# Patient Record
Sex: MALE | Race: WHITE | Employment: OTHER | URBAN - METROPOLITAN AREA
[De-identification: names, ages, dates, MRNs, and addresses within clinical notes are randomized per-mention and may not be internally consistent; named-entity substitution may affect disease eponyms.]

---

## 2019-07-02 ENCOUNTER — TELEPHONE (OUTPATIENT)
Dept: ADMINISTRATIVE | Age: 69
End: 2019-07-02

## 2019-08-07 ENCOUNTER — HOSPITAL ENCOUNTER (OUTPATIENT)
Dept: ULTRASOUND IMAGING | Age: 69
Discharge: HOME | End: 2019-08-09
Payer: OTHER GOVERNMENT

## 2019-08-07 DIAGNOSIS — R60.0 LOCALIZED EDEMA: ICD-10-CM

## 2019-08-07 PROCEDURE — 93970 EXTREMITY STUDY: CPT

## 2019-08-12 ENCOUNTER — TELEPHONE (OUTPATIENT)
Dept: PRIMARY CARE CLINIC | Age: 69
End: 2019-08-12

## 2019-08-12 NOTE — TELEPHONE ENCOUNTER
Patient's home health nurse Ally Castillo called, she says patient has IV pickline with vancomycin for his infected hip. He is c/o left calf pain, his calf does not look irritated at all, but the ankle does look red. She asked for a call back. Per DR Marcus Arguello called [place] back and advised that he go to the ER. Ally Castillo says because there was also a problem with the pickline, he was instructed to go to the ER already today. Per Ally Castillo, patient's wife is taking him to the ER today when she gets off work, to evaluate pickline and leg.

## 2019-10-21 RX ORDER — METOPROLOL TARTRATE 50 MG/1
50 TABLET, FILM COATED ORAL DAILY
COMMUNITY

## 2019-10-21 RX ORDER — AMLODIPINE BESYLATE 10 MG/1
10 TABLET ORAL DAILY
COMMUNITY

## 2019-10-21 RX ORDER — DULOXETIN HYDROCHLORIDE 30 MG/1
30 CAPSULE, DELAYED RELEASE ORAL DAILY
COMMUNITY
End: 2021-08-26

## 2019-10-21 RX ORDER — CHOLECALCIFEROL (VITAMIN D3) 125 MCG
TABLET ORAL DAILY
COMMUNITY
End: 2022-08-24 | Stop reason: SDUPTHER

## 2019-10-21 RX ORDER — EAR PLUGS
EACH OTIC (EAR) 2 TIMES DAILY
COMMUNITY

## 2019-10-21 RX ORDER — TRAZODONE HYDROCHLORIDE 50 MG/1
150 TABLET ORAL NIGHTLY
COMMUNITY
End: 2020-07-23

## 2019-10-21 RX ORDER — TAMSULOSIN HYDROCHLORIDE 0.4 MG/1
0.4 CAPSULE ORAL 2 TIMES DAILY
COMMUNITY
End: 2021-02-11

## 2019-10-21 RX ORDER — LOSARTAN POTASSIUM 50 MG/1
50 TABLET ORAL DAILY
COMMUNITY

## 2019-10-21 RX ORDER — CHLORAL HYDRATE 500 MG
2000 CAPSULE ORAL DAILY
COMMUNITY

## 2019-10-21 SDOH — HEALTH STABILITY: MENTAL HEALTH: HOW OFTEN DO YOU HAVE A DRINK CONTAINING ALCOHOL?: NEVER

## 2019-10-25 ENCOUNTER — HOSPITAL ENCOUNTER (OUTPATIENT)
Dept: ULTRASOUND IMAGING | Age: 69
Discharge: HOME | End: 2019-10-27
Payer: OTHER GOVERNMENT

## 2019-10-25 DIAGNOSIS — I82.90 DEEP VEIN THROMBOSIS (DVT) OF NON-EXTREMITY VEIN, UNSPECIFIED CHRONICITY: ICD-10-CM

## 2019-10-25 PROCEDURE — 93971 EXTREMITY STUDY: CPT

## 2020-01-13 LAB
ALBUMIN SERPL-MCNC: NORMAL G/DL
ALP BLD-CCNC: NORMAL U/L
ALT SERPL-CCNC: NORMAL U/L
ANION GAP SERPL CALCULATED.3IONS-SCNC: NORMAL MMOL/L
AST SERPL-CCNC: NORMAL U/L
BILIRUB SERPL-MCNC: NORMAL MG/DL
BUN BLDV-MCNC: 12 MG/DL
CALCIUM SERPL-MCNC: NORMAL MG/DL
CHLORIDE BLD-SCNC: NORMAL MMOL/L
CO2: NORMAL
CREAT SERPL-MCNC: 0.9 MG/DL
GFR CALCULATED: NORMAL
GLUCOSE BLD-MCNC: NORMAL MG/DL
POTASSIUM SERPL-SCNC: 3.8 MMOL/L
SODIUM BLD-SCNC: NORMAL MMOL/L
TOTAL PROTEIN: NORMAL

## 2020-01-23 ENCOUNTER — TELEPHONE (OUTPATIENT)
Dept: FAMILY MEDICINE CLINIC | Age: 70
End: 2020-01-23

## 2020-01-23 ENCOUNTER — OFFICE VISIT (OUTPATIENT)
Dept: PRIMARY CARE CLINIC | Age: 70
End: 2020-01-23
Payer: OTHER GOVERNMENT

## 2020-01-23 VITALS
SYSTOLIC BLOOD PRESSURE: 112 MMHG | DIASTOLIC BLOOD PRESSURE: 70 MMHG | HEART RATE: 68 BPM | HEIGHT: 73 IN | WEIGHT: 242 LBS | BODY MASS INDEX: 32.07 KG/M2

## 2020-01-23 PROBLEM — E78.5 HYPERLIPIDEMIA: Status: ACTIVE | Noted: 2020-01-23

## 2020-01-23 PROBLEM — I10 ESSENTIAL HYPERTENSION: Status: ACTIVE | Noted: 2017-11-21

## 2020-01-23 PROBLEM — M19.90 OSTEOARTHRITIS: Status: ACTIVE | Noted: 2020-01-23

## 2020-01-23 PROBLEM — G62.9 NEUROPATHY: Status: ACTIVE | Noted: 2020-01-23

## 2020-01-23 PROBLEM — I48.91 ATRIAL FIBRILLATION (HCC): Status: ACTIVE | Noted: 2020-01-23

## 2020-01-23 PROCEDURE — 4004F PT TOBACCO SCREEN RCVD TLK: CPT | Performed by: INTERNAL MEDICINE

## 2020-01-23 PROCEDURE — 99213 OFFICE O/P EST LOW 20 MIN: CPT | Performed by: INTERNAL MEDICINE

## 2020-01-23 PROCEDURE — G8484 FLU IMMUNIZE NO ADMIN: HCPCS | Performed by: INTERNAL MEDICINE

## 2020-01-23 PROCEDURE — 4040F PNEUMOC VAC/ADMIN/RCVD: CPT | Performed by: INTERNAL MEDICINE

## 2020-01-23 PROCEDURE — 1123F ACP DISCUSS/DSCN MKR DOCD: CPT | Performed by: INTERNAL MEDICINE

## 2020-01-23 PROCEDURE — 3017F COLORECTAL CA SCREEN DOC REV: CPT | Performed by: INTERNAL MEDICINE

## 2020-01-23 PROCEDURE — G8417 CALC BMI ABV UP PARAM F/U: HCPCS | Performed by: INTERNAL MEDICINE

## 2020-01-23 PROCEDURE — G8427 DOCREV CUR MEDS BY ELIG CLIN: HCPCS | Performed by: INTERNAL MEDICINE

## 2020-01-23 ASSESSMENT — ENCOUNTER SYMPTOMS
SORE THROAT: 0
TROUBLE SWALLOWING: 0
FACIAL SWELLING: 0
SHORTNESS OF BREATH: 0
COUGH: 0
PHOTOPHOBIA: 0
DIARRHEA: 0
BLOOD IN STOOL: 0
WHEEZING: 0
EYE ITCHING: 0
CONSTIPATION: 0
ANAL BLEEDING: 0
EYE PAIN: 0
VOMITING: 0
STRIDOR: 0
COLOR CHANGE: 0
NAUSEA: 0
ABDOMINAL PAIN: 0
EYE DISCHARGE: 0
RHINORRHEA: 0

## 2020-01-23 ASSESSMENT — PATIENT HEALTH QUESTIONNAIRE - PHQ9
2. FEELING DOWN, DEPRESSED OR HOPELESS: 0
SUM OF ALL RESPONSES TO PHQ QUESTIONS 1-9: 0
SUM OF ALL RESPONSES TO PHQ9 QUESTIONS 1 & 2: 0
SUM OF ALL RESPONSES TO PHQ QUESTIONS 1-9: 0
1. LITTLE INTEREST OR PLEASURE IN DOING THINGS: 0

## 2020-01-23 NOTE — PROGRESS NOTES
Rate and Rhythm: Normal rate. Rhythm irregular. Heart sounds: Normal heart sounds. No murmur. No friction rub. No gallop. Pulmonary:      Effort: Pulmonary effort is normal. No respiratory distress. Breath sounds: Normal breath sounds. No wheezing or rales. Chest:      Chest wall: No tenderness. Abdominal:      General: Bowel sounds are normal. There is no distension. Palpations: Abdomen is soft. There is no mass. Tenderness: There is no tenderness. There is no guarding or rebound. Musculoskeletal: Normal range of motion. General: No tenderness or deformity. Lymphadenopathy:      Cervical: No cervical adenopathy. Skin:     General: Skin is warm and dry. Coloration: Skin is not pale. Findings: No erythema or rash. Neurological:      General: No focal deficit present. Mental Status: He is alert and oriented to person, place, and time. Cranial Nerves: No cranial nerve deficit. Sensory: No sensory deficit. Gait: Gait normal.      Deep Tendon Reflexes: Reflexes normal.   Psychiatric:         Mood and Affect: Mood normal.         Behavior: Behavior normal.         Thought Content: Thought content normal.         Judgment: Judgment normal.          Last labs reviewed. ASSESSMENT & PLAN :   Problem List        Circulatory    Atrial fibrillation (HCC)     Continue Xarelto. He has  no bleeding. Follow-up with Dr. Adam Perez         Relevant Medications    metoprolol tartrate (LOPRESSOR) 50 MG tablet    losartan (COZAAR) 50 MG tablet    amLODIPine (NORVASC) 10 MG tablet    rivaroxaban (XARELTO) 20 MG TABS tablet    Essential hypertension     Blood pressures are stable. Continue medications and monitor blood pressures at home. Call office if systolics are over 690 over diastolics over 90.          Relevant Medications    metoprolol tartrate (LOPRESSOR) 50 MG tablet    losartan (COZAAR) 50 MG tablet    amLODIPine (NORVASC) 10 MG tablet    rivaroxaban

## 2020-07-23 ENCOUNTER — TELEPHONE (OUTPATIENT)
Dept: PRIMARY CARE CLINIC | Age: 70
End: 2020-07-23

## 2020-07-23 ENCOUNTER — OFFICE VISIT (OUTPATIENT)
Dept: PRIMARY CARE CLINIC | Age: 70
End: 2020-07-23
Payer: MEDICARE

## 2020-07-23 VITALS
SYSTOLIC BLOOD PRESSURE: 118 MMHG | HEIGHT: 73 IN | WEIGHT: 232 LBS | BODY MASS INDEX: 30.75 KG/M2 | DIASTOLIC BLOOD PRESSURE: 62 MMHG | HEART RATE: 72 BPM | TEMPERATURE: 98.3 F | OXYGEN SATURATION: 97 %

## 2020-07-23 PROCEDURE — 99213 OFFICE O/P EST LOW 20 MIN: CPT | Performed by: INTERNAL MEDICINE

## 2020-07-23 PROCEDURE — G8427 DOCREV CUR MEDS BY ELIG CLIN: HCPCS | Performed by: INTERNAL MEDICINE

## 2020-07-23 PROCEDURE — 4004F PT TOBACCO SCREEN RCVD TLK: CPT | Performed by: INTERNAL MEDICINE

## 2020-07-23 PROCEDURE — G8417 CALC BMI ABV UP PARAM F/U: HCPCS | Performed by: INTERNAL MEDICINE

## 2020-07-23 PROCEDURE — 1123F ACP DISCUSS/DSCN MKR DOCD: CPT | Performed by: INTERNAL MEDICINE

## 2020-07-23 PROCEDURE — 4040F PNEUMOC VAC/ADMIN/RCVD: CPT | Performed by: INTERNAL MEDICINE

## 2020-07-23 PROCEDURE — 3017F COLORECTAL CA SCREEN DOC REV: CPT | Performed by: INTERNAL MEDICINE

## 2020-07-23 RX ORDER — TRAZODONE HYDROCHLORIDE 100 MG/1
200 TABLET ORAL NIGHTLY
COMMUNITY

## 2020-07-23 ASSESSMENT — ENCOUNTER SYMPTOMS
ABDOMINAL PAIN: 0
EYE DISCHARGE: 0
CONSTIPATION: 0
STRIDOR: 0
EYE ITCHING: 0
EYE PAIN: 0
BLOOD IN STOOL: 0
FACIAL SWELLING: 0
WHEEZING: 0
ANAL BLEEDING: 0
TROUBLE SWALLOWING: 0
SHORTNESS OF BREATH: 0
VOMITING: 0
COUGH: 0
SORE THROAT: 0
COLOR CHANGE: 0
DIARRHEA: 0
PHOTOPHOBIA: 0
NAUSEA: 0
RHINORRHEA: 0

## 2020-07-23 NOTE — PROGRESS NOTES
2020    Name: Kale Vu : 1950 Sex: male  Age: 79 y.o. 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 He had his left hip replaced. He was initially at [facility] and then transferred to the [place] [facility]. They removed the infected prosthetic joint and put a spacer in it and after the infection was cleared up with IV antibiotics they replaced his joint. He still has to use a cane to ambulate. He has a history of chronic atrial fibrillation on Xarelto. Sees Dr. Pamella Schaumann. He has a history of hypertension, BPH, neuropathy, PTSD and hyperlipidemia. He refuses all immunizations. Colonoscopy was done by [facility] on 2018 which showed a tubular adenoma. Recheck colonoscopy recommended in 3 years. All blood work done at the [place], last one was yesterday. .  We will get results. Review of Systems   Constitutional: Negative for appetite change, fatigue and unexpected weight change. HENT: Negative for congestion, ear pain, facial swelling, rhinorrhea, sore throat, tinnitus and trouble swallowing. Eyes: Negative for photophobia, pain, discharge, itching and visual disturbance. Respiratory: Negative for cough, shortness of breath, wheezing and stridor. Cardiovascular: Negative for chest pain, palpitations and leg swelling. Gastrointestinal: Negative for abdominal pain, anal bleeding, blood in stool, constipation, diarrhea, nausea and vomiting. Endocrine: Negative for cold intolerance, heat intolerance, polydipsia, polyphagia and polyuria. Genitourinary: Negative for difficulty urinating, dysuria, flank pain, frequency, hematuria and urgency. Musculoskeletal: Negative for arthralgias, gait problem, joint swelling and myalgias.         Left hip pain   Skin: Negative for color change, pallor and rash. Allergic/Immunologic: Negative for environmental allergies and food allergies. Neurological: Positive for numbness. Negative for dizziness, tremors, seizures, syncope, speech difficulty, weakness, light-headedness and headaches. Mainly of his  fingers   Hematological: Negative for adenopathy. Does not bruise/bleed easily. Psychiatric/Behavioral: Negative for agitation, behavioral problems, confusion, sleep disturbance and suicidal ideas. The patient is not nervous/anxious.            Current Outpatient Medications:     traZODone (DESYREL) 100 MG tablet, Take 200 mg by mouth nightly, Disp: , Rfl:     metoprolol tartrate (LOPRESSOR) 50 MG tablet, Take 50 mg by mouth daily , Disp: , Rfl:     losartan (COZAAR) 50 MG tablet, Take 50 mg by mouth daily, Disp: , Rfl:     amLODIPine (NORVASC) 10 MG tablet, Take 10 mg by mouth daily, Disp: , Rfl:     Omega-3 Fatty Acids (FISH OIL) 1000 MG CAPS, Take 2,000 mg by mouth daily, Disp: , Rfl:     Multiple Vitamins-Minerals (MULTI FOR HIM) PACK, Take by mouth 2 times daily, Disp: , Rfl:     tamsulosin (FLOMAX) 0.4 MG capsule, Take 0.4 mg by mouth 2 times daily, Disp: , Rfl:     rivaroxaban (XARELTO) 20 MG TABS tablet, Take 20 mg by mouth daily, Disp: , Rfl:     Ergocalciferol (VITAMIN D2) 2000 units TABS, Take by mouth daily, Disp: , Rfl:     DULoxetine (CYMBALTA) 30 MG extended release capsule, Take 30 mg by mouth daily, Disp: , Rfl:      Allergies   Allergen Reactions    Buchu-Cornsilk- Grass-Hydran Other (See Comments)     delirium    Tricyclic Antidepressants Other (See Comments)     delirium    Furosemide     Morphine     Statins     Sulfa Antibiotics         Past Medical History:   Diagnosis Date    Atrial fibrillation (HCC)     Hyperlipidemia     Neuropathy     Osteoarthritis        Health Maintenance Due   Topic Date Due    AAA screen  1950    Hepatitis C screen  1950    DTaP/Tdap/Td vaccine (1 - Tdap) 01/19/1969    Lipid screen  01/19/1990    Diabetes screen  01/19/1990    Shingles Vaccine (1 of 2) 01/19/2000    Low dose CT lung screening  01/19/2005    Pneumococcal 65+ years Vaccine (1 of 1 - PPSV23) 01/19/2015        Patient Active Problem List   Diagnosis    Atrial fibrillation ([facility] [address]    Essential hypertension    Neuropathy    Osteoarthritis    Hyperlipidemia        Past Surgical History:   Procedure Laterality Date    FOOT NEUROMA SURGERY Right     KNEE ARTHROPLASTY Bilateral     SHOULDER SURGERY Right     REMOVAL OF SPUR    TOTAL HIP ARTHROPLASTY      TUMOR EXCISION Right     WARTHINS TUMOR RIGHT NECK        Family History   Problem Relation Age of Onset    Colon Cancer Sister         Social History     Tobacco Use    Smoking status: Heavy Tobacco Smoker     Packs/day: 1.00     Years: 30.00     Pack years: 30.00     Types: Cigarettes     Start date: 1/23/1990    Smokeless tobacco: Never Used   Substance Use Topics    Alcohol use: Never     Frequency: Never    Drug use: Never        Objective  Vitals:    07/23/20 1405   BP: 118/62   Site: Right Upper Arm   Position: Sitting   Cuff Size: Medium Adult   Pulse: 72   Temp: 98.3 °F (36.8 °C)   TempSrc: Temporal   SpO2: 97%   Weight: 232 lb (105.2 kg)   Height: 6' 1\" (1.854 m)        Exam:  Physical Exam  Vitals signs reviewed. Constitutional:       General: He is not in acute distress. Appearance: He is well-developed. He is obese. Comments: Uses cane   HENT:      Head: Normocephalic. Right Ear: External ear normal.      Left Ear: External ear normal.      Nose: Nose normal.      Mouth/Throat:      Pharynx: No oropharyngeal exudate. Eyes:      General: No scleral icterus. Right eye: No discharge. Left eye: No discharge. Conjunctiva/sclera: Conjunctivae normal.      Pupils: Pupils are equal, round, and reactive to light. Neck:      Musculoskeletal: Normal range of motion and neck supple.       Thyroid: No thyromegaly. Cardiovascular:      Rate and Rhythm: Normal rate. Rhythm irregular. Heart sounds: Normal heart sounds. No murmur. No friction rub. No gallop. Pulmonary:      Effort: Pulmonary effort is normal. No respiratory distress. Breath sounds: Normal breath sounds. No wheezing or rales. Chest:      Chest wall: No tenderness. Abdominal:      General: Bowel sounds are normal. There is no distension. Palpations: Abdomen is soft. There is no mass. Tenderness: There is no abdominal tenderness. There is no guarding or rebound. Musculoskeletal: Normal range of motion. General: No tenderness or deformity. Lymphadenopathy:      Cervical: No cervical adenopathy. Skin:     General: Skin is warm and dry. Coloration: Skin is not pale. Findings: No erythema or rash. Neurological:      General: No focal deficit present. Mental Status: He is alert and oriented to person, place, and time. Cranial Nerves: No cranial nerve deficit. Sensory: No sensory deficit. Gait: Gait normal.      Deep Tendon Reflexes: Reflexes normal.   Psychiatric:         Mood and Affect: Mood normal.         Behavior: Behavior normal.         Thought Content: Thought content normal.         Judgment: Judgment normal.          Last labs reviewed. ASSESSMENT & PLAN :   Problem List        Circulatory    Atrial fibrillation ([facility] [address] - Primary     Continue Xarelto and follow-up with cardiologist.  Get results of blood work done yesterday at the [place] [address] hypertension     Blood pressures are stable. Continue medications and monitor blood pressures at home. Call office if systolics are over 416 over diastolics over 90. Nervous and Auditory    Neuropathy     Continue duloxetine            Musculoskeletal and Integument    Osteoarthritis     He has finished physical therapy.   He uses a cane to ambulate            Other    Hyperlipidemia     Watch saturated fats in diet and will monitor lipids         Relevant Medications    metoprolol tartrate (LOPRESSOR) 50 MG tablet    losartan (COZAAR) 50 MG tablet    amLODIPine (NORVASC) 10 MG tablet    rivaroxaban (XARELTO) 20 MG TABS tablet           Return in about 6 months (around 1/23/2021).        Richie Saenz DO  7/24/2020

## 2020-07-24 NOTE — ASSESSMENT & PLAN NOTE
Blood pressures are stable. Continue medications and monitor blood pressures at home. Call office if systolics are over 645 over diastolics over 90.

## 2020-07-24 NOTE — ASSESSMENT & PLAN NOTE
Continue Xarelto and follow-up with cardiologist.  Get results of blood work done yesterday at the [place] in [place]

## 2021-02-11 ENCOUNTER — OFFICE VISIT (OUTPATIENT)
Dept: PRIMARY CARE CLINIC | Age: 71
End: 2021-02-11
Payer: MEDICARE

## 2021-02-11 VITALS
SYSTOLIC BLOOD PRESSURE: 130 MMHG | OXYGEN SATURATION: 98 % | HEART RATE: 77 BPM | BODY MASS INDEX: 33.53 KG/M2 | HEIGHT: 73 IN | DIASTOLIC BLOOD PRESSURE: 80 MMHG | TEMPERATURE: 98.2 F | WEIGHT: 253 LBS

## 2021-02-11 DIAGNOSIS — I48.11 LONGSTANDING PERSISTENT ATRIAL FIBRILLATION (HCC): ICD-10-CM

## 2021-02-11 DIAGNOSIS — G47.33 OSA (OBSTRUCTIVE SLEEP APNEA): ICD-10-CM

## 2021-02-11 DIAGNOSIS — I10 ESSENTIAL HYPERTENSION: Primary | ICD-10-CM

## 2021-02-11 DIAGNOSIS — E78.2 MIXED HYPERLIPIDEMIA: ICD-10-CM

## 2021-02-11 DIAGNOSIS — Z13.220 SCREENING FOR CHOLESTEROL LEVEL: ICD-10-CM

## 2021-02-11 DIAGNOSIS — R05.9 COUGH: ICD-10-CM

## 2021-02-11 DIAGNOSIS — M19.91 PRIMARY OSTEOARTHRITIS, UNSPECIFIED SITE: ICD-10-CM

## 2021-02-11 DIAGNOSIS — G62.9 NEUROPATHY: ICD-10-CM

## 2021-02-11 DIAGNOSIS — F17.200 NICOTINE DEPENDENCE WITH CURRENT USE: ICD-10-CM

## 2021-02-11 DIAGNOSIS — G47.34 HYPOXIA, SLEEP RELATED: ICD-10-CM

## 2021-02-11 DIAGNOSIS — K59.1 FUNCTIONAL DIARRHEA: ICD-10-CM

## 2021-02-11 LAB
CHOLESTEROL, TOTAL: 197 MG/DL (ref 0–199)
HDLC SERPL-MCNC: 31 MG/DL
LDL CHOLESTEROL CALCULATED: ABNORMAL MG/DL (ref 0–99)
T4 FREE: 1.14 NG/DL (ref 0.93–1.7)
TRIGL SERPL-MCNC: 416 MG/DL (ref 0–149)
TSH SERPL DL<=0.05 MIU/L-ACNC: 2.71 UIU/ML (ref 0.27–4.2)
VLDLC SERPL CALC-MCNC: ABNORMAL MG/DL

## 2021-02-11 PROCEDURE — 4040F PNEUMOC VAC/ADMIN/RCVD: CPT | Performed by: INTERNAL MEDICINE

## 2021-02-11 PROCEDURE — 3017F COLORECTAL CA SCREEN DOC REV: CPT | Performed by: INTERNAL MEDICINE

## 2021-02-11 PROCEDURE — 4004F PT TOBACCO SCREEN RCVD TLK: CPT | Performed by: INTERNAL MEDICINE

## 2021-02-11 PROCEDURE — 1123F ACP DISCUSS/DSCN MKR DOCD: CPT | Performed by: INTERNAL MEDICINE

## 2021-02-11 PROCEDURE — G8427 DOCREV CUR MEDS BY ELIG CLIN: HCPCS | Performed by: INTERNAL MEDICINE

## 2021-02-11 PROCEDURE — G8484 FLU IMMUNIZE NO ADMIN: HCPCS | Performed by: INTERNAL MEDICINE

## 2021-02-11 PROCEDURE — 99214 OFFICE O/P EST MOD 30 MIN: CPT | Performed by: INTERNAL MEDICINE

## 2021-02-11 PROCEDURE — G8417 CALC BMI ABV UP PARAM F/U: HCPCS | Performed by: INTERNAL MEDICINE

## 2021-02-11 RX ORDER — DICYCLOMINE HYDROCHLORIDE 10 MG/1
10 CAPSULE ORAL 3 TIMES DAILY PRN
Qty: 60 CAPSULE | Refills: 2 | Status: SHIPPED
Start: 2021-02-11 | End: 2022-08-24 | Stop reason: ALTCHOICE

## 2021-02-11 RX ORDER — AMOXICILLIN 500 MG/1
CAPSULE ORAL
COMMUNITY
Start: 2021-01-07 | End: 2022-08-24 | Stop reason: ALTCHOICE

## 2021-02-11 RX ORDER — TAMSULOSIN HYDROCHLORIDE 0.4 MG/1
0.4 CAPSULE ORAL DAILY
COMMUNITY
End: 2021-02-11

## 2021-02-11 ASSESSMENT — ENCOUNTER SYMPTOMS
CONSTIPATION: 0
TROUBLE SWALLOWING: 0
PHOTOPHOBIA: 0
EYE PAIN: 0
BLOOD IN STOOL: 0
ANAL BLEEDING: 0
NAUSEA: 0
SORE THROAT: 0
DIARRHEA: 1
EYE ITCHING: 0
STRIDOR: 0
COLOR CHANGE: 0
APNEA: 1
RHINORRHEA: 0
EYE DISCHARGE: 0
ABDOMINAL PAIN: 0
VOMITING: 0
WHEEZING: 0
FACIAL SWELLING: 0
COUGH: 1
SHORTNESS OF BREATH: 0

## 2021-02-11 ASSESSMENT — PATIENT HEALTH QUESTIONNAIRE - PHQ9
SUM OF ALL RESPONSES TO PHQ QUESTIONS 1-9: 0
SUM OF ALL RESPONSES TO PHQ QUESTIONS 1-9: 0

## 2021-02-11 NOTE — PROGRESS NOTES
2021    Name: Gibran Bynum : 1950 Sex: male  Age: 70 y.o. Subjective:  Chief Complaint   Patient presents with    6 Month Follow-Up        HPI       He has a history of chronic atrial fibrillation on Xarelto. Sees Dr. Love Adam. He has a history of hypertension, BPH, neuropathy, PTSD and hyperlipidemia. He refuses all immunizations. Colonoscopy was done by [facility] on 2021. This did not show any polyps. Previous colonoscopy in 2018 showed tubular adenomas. He did have internal hemorrhoids and moderate diverticulosis. Recommend recheck colonoscopy in 5 years. He also recommended yearly checks for occult blood starting in 2 years. Blood work to be done at the [place] this spring. Previous blood work in the spring 2020 showed hemoglobin of 11.2 and hematocrit 35.8. He says he has occasional bleeding from his rectum when his hemorrhoids \"act up\". He saw Dr. Cristal Erickson this year for his eye examination    His wife says that she believes he has sleep apnea. She watches him when he sleeps that he will stop breathing for about 10 to 15 seconds and then start gasping for breath and his legs would move around. He also snores quite a bit at night. Patient says that he is very tired during the daytime he sleeps for 2 hours during the day . His wife has checked his O2 level with a pulse oximeter while he stops breathing which she says it goes down to 88%. Once he starts breathing it goes up to about 92%. I feel he needs evaluation for obstructive sleep apnea and restless leg syndrome. We willsend the appropriate order for this. .    He saw Dr. Dean Farias who did some type of procedure him which helped his BPH. He is no longer on Flomax    His wife notes that he has a chronic cough which is worse when he sleeps. He does not cough much during the day. He has not had any evaluation for this. Years ago his wife said the [place] doctor told him he had a \"touch of emphysema\".   Despite this patient continues to smoke a pack a day and has for many many years. We will check pulmonary function tests and a chest x-ray. Note that he is on an ARB. I am pretty sure he was on the arm long before his cough started but to be on the safe side we will discontinue the ARB for about 2 to 3 weeks to see if his cough resolves    Another problem is recurrent diarrhea. He did have some spasm on his colonoscopy. His wife says she has been trying to eliminate certain foods from his diet but nothing really works. He occasionally gets cramps with this. Stools sometimes soft and sometimes watery. There is no blood in his stool. Review of Systems   Constitutional: Negative for appetite change, fatigue and unexpected weight change. HENT: Negative for congestion, ear pain, facial swelling, rhinorrhea, sore throat, tinnitus and trouble swallowing. Eyes: Negative for photophobia, pain, discharge, itching and visual disturbance. Respiratory: Positive for apnea and cough. Negative for shortness of breath, wheezing and stridor. 10-15 seconds apneic spells, snoring, leg movement   Cardiovascular: Negative for chest pain, palpitations and leg swelling. Gastrointestinal: Positive for diarrhea. Negative for abdominal pain, anal bleeding, blood in stool, constipation, nausea and vomiting. Endocrine: Negative for cold intolerance, heat intolerance, polydipsia, polyphagia and polyuria. Genitourinary: Negative for difficulty urinating, dysuria, flank pain, frequency, hematuria and urgency. Musculoskeletal: Negative for arthralgias, gait problem, joint swelling and myalgias. Left hip pain   Skin: Negative for color change, pallor and rash. Allergic/Immunologic: Negative for environmental allergies and food allergies. Neurological: Positive for numbness. Negative for dizziness, tremors, seizures, syncope, speech difficulty, weakness, light-headedness and headaches.         Mainly of his Vaccine (1 of 2) 01/19/2000    Low dose CT lung screening  01/19/2005    Pneumococcal 65+ years Vaccine (1 of 1 - PPSV23) 01/19/2015    Flu vaccine (1) 09/01/2020    Potassium monitoring  01/13/2021    Creatinine monitoring  01/13/2021        Patient Active Problem List   Diagnosis    Atrial fibrillation (HCC)    Essential hypertension    Neuropathy    Osteoarthritis    Hyperlipidemia    Hypoxia, sleep related    LIZ (obstructive sleep apnea)    Functional diarrhea    Cough        Past Surgical History:   Procedure Laterality Date    FOOT NEUROMA SURGERY Right     KNEE ARTHROPLASTY Bilateral     SHOULDER SURGERY Right     REMOVAL OF SPUR    TOTAL HIP ARTHROPLASTY      TUMOR EXCISION Right     WARTHINS TUMOR RIGHT NECK        Family History   Problem Relation Age of Onset    Colon Cancer Sister         Social History     Tobacco Use    Smoking status: Heavy Tobacco Smoker     Packs/day: 1.00     Years: 30.00     Pack years: 30.00     Types: Cigarettes     Start date: 1/23/1990    Smokeless tobacco: Never Used   Substance Use Topics    Alcohol use: Never     Frequency: Never    Drug use: Never        Objective  Vitals:    02/11/21 0757   BP: 130/80   Site: Right Upper Arm   Position: Sitting   Cuff Size: Medium Adult   Pulse: 77   Temp: 98.2 °F (36.8 °C)   TempSrc: Temporal   SpO2: 98%   Weight: 253 lb (114.8 kg)   Height: 6' 1\" (1.854 m)        Exam:  Physical Exam  Vitals signs reviewed. Constitutional:       General: He is not in acute distress. Appearance: He is well-developed. He is obese. Comments: Uses cane   HENT:      Head: Normocephalic. Right Ear: External ear normal.      Left Ear: External ear normal.      Nose: Nose normal.      Mouth/Throat:      Pharynx: No oropharyngeal exudate. Eyes:      General: No scleral icterus. Right eye: No discharge. Left eye: No discharge.       Conjunctiva/sclera: Conjunctivae normal.      Pupils: Pupils are equal, round, and reactive to light. Neck:      Musculoskeletal: Normal range of motion and neck supple. Thyroid: No thyromegaly. Cardiovascular:      Rate and Rhythm: Normal rate. Rhythm irregular. Heart sounds: Normal heart sounds. No murmur. No friction rub. No gallop. Pulmonary:      Effort: Pulmonary effort is normal. No respiratory distress. Breath sounds: No wheezing or rales. Comments: Decreased breath sounds with occasional wheeze  Chest:      Chest wall: No tenderness. Abdominal:      General: Bowel sounds are normal. There is no distension. Palpations: Abdomen is soft. There is no mass. Tenderness: There is no abdominal tenderness. There is no guarding or rebound. Musculoskeletal: Normal range of motion. General: No tenderness or deformity. Comments: Walks with a cane   Lymphadenopathy:      Cervical: No cervical adenopathy. Skin:     General: Skin is warm and dry. Coloration: Skin is not pale. Findings: No erythema or rash. Neurological:      General: No focal deficit present. Mental Status: He is alert and oriented to person, place, and time. Cranial Nerves: No cranial nerve deficit. Sensory: No sensory deficit. Gait: Gait normal.      Deep Tendon Reflexes: Reflexes normal.   Psychiatric:         Mood and Affect: Mood normal.         Behavior: Behavior normal.         Thought Content: Thought content normal.         Judgment: Judgment normal.          Last labs reviewed. ASSESSMENT & PLAN :   Problem List        Circulatory    Atrial fibrillation (HCC)     Continue Xarelto as per cardiology         Relevant Orders    T4, Free    TSH without Reflex    Essential hypertension - Primary     Blood pressures are stable. Continue medications and monitor blood pressures at home. Monitor his blood pressures when we discontinue his losartan.   Let me know if they go over 220 systolic or over 90 diastolic             Respiratory 2/25/2021), or after tests.        Susanna Barrios,   2/11/2021

## 2021-02-11 NOTE — ASSESSMENT & PLAN NOTE
Discontinue the ARB and see if thisstops his cough. Check a chest x-ray and pulmonary function test as he has a long history of cigarette use and has not had any formal evaluation for COPD in several years.   Depending on the results he may need a long-term inhaler and a as needed inhaler along with evaluation by pulmonologist

## 2021-02-11 NOTE — ASSESSMENT & PLAN NOTE
I think this probably secondary to irritable bowel syndrome. His wife is currently doing food elimination study to see if any particular foods aggravate or start his diarrhea.   In the meantime we can try him on Bentyl on an as-needed basis when he gets cramps and diarrhea

## 2021-02-11 NOTE — ASSESSMENT & PLAN NOTE
Set up home sleep study through [facility]. If this is positive we will set him up for CPAP titration. Because of his nocturnal hypoxia I recommend that they check his overnight oxygen level as well.   He may need oxygen at night either bled into his CPAP or through nasal cannula if he does not have LIZ

## 2021-02-11 NOTE — ASSESSMENT & PLAN NOTE
Blood pressures are stable. Continue medications and monitor blood pressures at home. Monitor his blood pressures when we discontinue his losartan.   Let me know if they go over 108 systolic or over 90 diastolic

## 2021-02-18 ENCOUNTER — VIRTUAL VISIT (OUTPATIENT)
Dept: PRIMARY CARE CLINIC | Age: 71
End: 2021-02-18
Payer: MEDICARE

## 2021-02-18 DIAGNOSIS — E78.2 MIXED HYPERLIPIDEMIA: Primary | ICD-10-CM

## 2021-02-18 PROCEDURE — 99442 PR PHYS/QHP TELEPHONE EVALUATION 11-20 MIN: CPT | Performed by: INTERNAL MEDICINE

## 2021-02-18 ASSESSMENT — ENCOUNTER SYMPTOMS
FACIAL SWELLING: 0
VOMITING: 0
APNEA: 1
EYE ITCHING: 0
CONSTIPATION: 0
WHEEZING: 0
EYE PAIN: 0
COLOR CHANGE: 0
BLOOD IN STOOL: 0
SHORTNESS OF BREATH: 0
ABDOMINAL PAIN: 0
EYE DISCHARGE: 0
PHOTOPHOBIA: 0
TROUBLE SWALLOWING: 0
STRIDOR: 0
DIARRHEA: 1
RHINORRHEA: 0
SORE THROAT: 0
NAUSEA: 0
ANAL BLEEDING: 0
COUGH: 1

## 2021-02-18 NOTE — PROGRESS NOTES
2021    Name: Hyacinth Clayton : 1950 Sex: male  Age: 70 y.o. Subjective:  Chief Complaint   Patient presents with    Results     labs        HPI     Patient called to discuss his blood work. His total cholesterol was not bad. His HDL cholesterol is too low. But his triglycerides are over 400. I discussed this with him. [facility] omega-3 fish oil 1000 mg 2 pills daily. I explained to him that this is not working really well that we probably should switch him to something like Lovaza which would bring his triglycerides down nicely. He is unwilling to try that. He says he will try watching his diet specifically his carbs and sweets and trying to lose some weight. He is unable to exercise because of his arthritis. I will see him in about 3 months and will recheck his lipids and if they are still markedly elevated we will go ahead and again recommend that he go on low vasa. When we check his lipids were to make sure that we order an direct LDL instead of the calculated LDL. He has a history of chronic atrial fibrillation on Xarelto. Sees Dr. Sonya Fleischer. He has a history of hypertension, BPH, neuropathy, PTSD and hyperlipidemia. He refuses all immunizations. Blood work to be done at the [place] this spring. Previous blood work in the spring 2020 showed hemoglobin of 11.2 and hematocrit 35.8. He says he has occasional bleeding from his rectum when his hemorrhoids \"act up\". He saw Dr. Yousif Carrillo this year for his eye examination    His wife says that she believes he has sleep apnea. She watches him when he sleeps that he will stop breathing for about 10 to 15 seconds and then start gasping for breath and his legs would move around. He also snores quite a bit at night. Patient says that he is very tired during the daytime he sleeps for 2 hours during the day . His wife has checked his O2 level with a pulse oximeter while he stops breathing which she says it goes down to 88%.   Once he starts breathing it goes up to about 92%. I feel he needs evaluation for obstructive sleep apnea and restless leg syndrome. We willsend the appropriate order for this. .    Unfortunately none of the local facilities will do these tests because his insurance will only pay for the tests if they are done at the [place] facility. I told him to ask the [place] doctor to order these tests for him. His wife notes that he has a chronic cough which is worse when he sleeps. He does not cough much during the day. He has not had any evaluation for this. Years ago his wife said the [place] doctor told him he had a \"touch of emphysema\". Despite this patient continues to smoke a pack a day and has for many many years. We will check pulmonary function tests and a chest x-ray. Note that he is on an ARB. I am pretty sure he was on the arm long before his cough started but to be on the safe side we will discontinue the ARB for about 2 to 3 weeks to see if his cough resolves        Review of Systems   Constitutional: Negative for appetite change, fatigue and unexpected weight change. HENT: Negative for congestion, ear pain, facial swelling, rhinorrhea, sore throat, tinnitus and trouble swallowing. Eyes: Negative for photophobia, pain, discharge, itching and visual disturbance. Respiratory: Positive for apnea and cough. Negative for shortness of breath, wheezing and stridor. 10-15 seconds apneic spells, snoring, leg movement   Cardiovascular: Negative for chest pain, palpitations and leg swelling. Gastrointestinal: Positive for diarrhea. Negative for abdominal pain, anal bleeding, blood in stool, constipation, nausea and vomiting. Endocrine: Negative for cold intolerance, heat intolerance, polydipsia, polyphagia and polyuria. Genitourinary: Negative for difficulty urinating, dysuria, flank pain, frequency, hematuria and urgency. Musculoskeletal: Negative for arthralgias, gait problem, joint swelling and myalgias. Left hip pain   Skin: Negative for color change, pallor and rash. Allergic/Immunologic: Negative for environmental allergies and food allergies. Neurological: Positive for numbness. Negative for dizziness, tremors, seizures, syncope, speech difficulty, weakness, light-headedness and headaches. Mainly of his  fingers   Hematological: Negative for adenopathy. Does not bruise/bleed easily. Psychiatric/Behavioral: Negative for agitation, behavioral problems, confusion, sleep disturbance and suicidal ideas. The patient is not nervous/anxious.          Problems with memory          Current Outpatient Medications:     amoxicillin (AMOXIL) 500 MG capsule, TAKE FOUR CAPSULES BY MOUTH ONE HOUR BEFORE APPOINTMENT, Disp: , Rfl:     dicyclomine (BENTYL) 10 MG capsule, Take 1 capsule by mouth 3 times daily as needed (diarrhea and cramps), Disp: 60 capsule, Rfl: 2    traZODone (DESYREL) 100 MG tablet, Take 200 mg by mouth nightly, Disp: , Rfl:     metoprolol tartrate (LOPRESSOR) 50 MG tablet, Take 50 mg by mouth daily , Disp: , Rfl:     losartan (COZAAR) 50 MG tablet, Take 50 mg by mouth daily, Disp: , Rfl:     amLODIPine (NORVASC) 10 MG tablet, Take 10 mg by mouth daily, Disp: , Rfl:     Omega-3 Fatty Acids (FISH OIL) 1000 MG CAPS, Take 2,000 mg by mouth daily, Disp: , Rfl:     Multiple Vitamins-Minerals (MULTI FOR HIM) PACK, Take by mouth 2 times daily, Disp: , Rfl:     rivaroxaban (XARELTO) 20 MG TABS tablet, Take 20 mg by mouth daily, Disp: , Rfl:     Ergocalciferol (VITAMIN D2) 2000 units TABS, Take by mouth daily, Disp: , Rfl:     DULoxetine (CYMBALTA) 30 MG extended release capsule, Take 30 mg by mouth daily, Disp: , Rfl:      Allergies   Allergen Reactions    Buchu-Cornsilk-Ch Grass-Hydran Other (See Comments)     delirium    Tricyclic Antidepressants Other (See Comments)     delirium    Furosemide     Morphine     Statins     Sulfa Antibiotics         Past Medical History:   Diagnosis Date    Atrial fibrillation ([facility] [address]     Hyperlipidemia     Neuropathy     Osteoarthritis        Health Maintenance Due   Topic Date Due    AAA screen  1950    Hepatitis C screen  1950    COVID-19 Vaccine (1 of 2) 01/19/1966    DTaP/Tdap/Td vaccine (1 - Tdap) 01/19/1969    Shingles Vaccine (1 of 2) 01/19/2000    Low dose CT lung screening  01/19/2005    Pneumococcal 65+ years Vaccine (1 of 1 - PPSV23) 01/19/2015    Flu vaccine (1) 09/01/2020    Potassium monitoring  01/13/2021    Creatinine monitoring  01/13/2021        Patient Active Problem List   Diagnosis    Atrial fibrillation (HCC)    Essential hypertension    Neuropathy    Osteoarthritis    Hyperlipidemia    Hypoxia, sleep related    LIZ (obstructive sleep apnea)    Functional diarrhea    Cough        Past Surgical History:   Procedure Laterality Date    FOOT NEUROMA SURGERY Right     KNEE ARTHROPLASTY Bilateral     SHOULDER SURGERY Right     REMOVAL OF SPUR    TOTAL HIP ARTHROPLASTY      TUMOR EXCISION Right     WARTHINS TUMOR RIGHT NECK        Family History   Problem Relation Age of Onset    Colon Cancer Sister         Social History     Tobacco Use    Smoking status: Heavy Tobacco Smoker     Packs/day: 1.00     Years: 30.00     Pack years: 30.00     Types: Cigarettes     Start date: 1/23/1990    Smokeless tobacco: Never Used   Substance Use Topics    Alcohol use: Never     Frequency: Never    Drug use: Never        Objective  There were no vitals filed for this visit. Exam:      Last labs reviewed. ASSESSMENT & PLAN :   Problem List        Other    Hyperlipidemia - Primary    Relevant Medications    metoprolol tartrate (LOPRESSOR) 50 MG tablet    losartan (COZAAR) 50 MG tablet    amLODIPine (NORVASC) 10 MG tablet    rivaroxaban (XARELTO) 20 MG TABS tablet           Return in about 3 months (around 5/18/2021).        Layne Whalen DO  2/18/2021     Jeanine Powell is a 70 y.o. male evaluated via

## 2021-03-13 PROBLEM — R05.9 COUGH: Status: RESOLVED | Noted: 2021-02-11 | Resolved: 2021-03-13

## 2021-07-21 ENCOUNTER — TELEPHONE (OUTPATIENT)
Dept: PRIMARY CARE CLINIC | Age: 71
End: 2021-07-21

## 2021-07-21 NOTE — TELEPHONE ENCOUNTER
----- Message from Len Hernández sent at 7/15/2021 10:37 AM EDT -----  Subject: Message to Provider    QUESTIONS  Information for Provider? Service expert could not reschedule patient's   appt on 8/12. Patient wanted to move his appointment to 8/20 in the   morning time around 8:30-9:00.  ---------------------------------------------------------------------------  --------------  CALL BACK INFO  What is the best way for the office to contact you? OK to leave message on   voicemail  Preferred Call Back Phone Number? [phone]  ---------------------------------------------------------------------------  --------------  SCRIPT ANSWERS  Relationship to Patient? Other  Representative Name? Eyad Jim - wife  Is the Representative on the appropriate HIPAA document in Epic?  Yes

## 2021-08-26 ENCOUNTER — OFFICE VISIT (OUTPATIENT)
Dept: PRIMARY CARE CLINIC | Age: 71
End: 2021-08-26
Payer: MEDICARE

## 2021-08-26 ENCOUNTER — TELEPHONE (OUTPATIENT)
Dept: PRIMARY CARE CLINIC | Age: 71
End: 2021-08-26

## 2021-08-26 VITALS
DIASTOLIC BLOOD PRESSURE: 85 MMHG | WEIGHT: 250 LBS | BODY MASS INDEX: 32.98 KG/M2 | SYSTOLIC BLOOD PRESSURE: 138 MMHG | OXYGEN SATURATION: 95 % | HEART RATE: 76 BPM | TEMPERATURE: 97.8 F

## 2021-08-26 DIAGNOSIS — F32.1 CURRENT MODERATE EPISODE OF MAJOR DEPRESSIVE DISORDER WITHOUT PRIOR EPISODE (HCC): ICD-10-CM

## 2021-08-26 DIAGNOSIS — I10 ESSENTIAL HYPERTENSION: ICD-10-CM

## 2021-08-26 DIAGNOSIS — I48.11 LONGSTANDING PERSISTENT ATRIAL FIBRILLATION (HCC): ICD-10-CM

## 2021-08-26 DIAGNOSIS — Z91.199 PERSONAL HISTORY OF NONCOMPLIANCE WITH MEDICAL TREATMENT, PRESENTING HAZARDS TO HEALTH: ICD-10-CM

## 2021-08-26 DIAGNOSIS — K59.1 FUNCTIONAL DIARRHEA: ICD-10-CM

## 2021-08-26 DIAGNOSIS — R41.89 COGNITIVE IMPAIRMENT: ICD-10-CM

## 2021-08-26 DIAGNOSIS — J44.9 CHRONIC OBSTRUCTIVE PULMONARY DISEASE, UNSPECIFIED COPD TYPE (HCC): Primary | ICD-10-CM

## 2021-08-26 DIAGNOSIS — G47.33 OSA (OBSTRUCTIVE SLEEP APNEA): ICD-10-CM

## 2021-08-26 DIAGNOSIS — E78.1 PURE HYPERTRIGLYCERIDEMIA: ICD-10-CM

## 2021-08-26 PROBLEM — F32.9 MAJOR DEPRESSIVE DISORDER WITH SINGLE EPISODE: Status: ACTIVE | Noted: 2021-08-26

## 2021-08-26 PROBLEM — F17.200 SMOKES TOBACCO DAILY: Status: ACTIVE | Noted: 2021-08-26

## 2021-08-26 PROBLEM — Z96.659 KNEE JOINT REPLACED BY OTHER MEANS: Status: ACTIVE | Noted: 2021-08-26

## 2021-08-26 PROCEDURE — 4004F PT TOBACCO SCREEN RCVD TLK: CPT | Performed by: INTERNAL MEDICINE

## 2021-08-26 PROCEDURE — 3023F SPIROM DOC REV: CPT | Performed by: INTERNAL MEDICINE

## 2021-08-26 PROCEDURE — G8926 SPIRO NO PERF OR DOC: HCPCS | Performed by: INTERNAL MEDICINE

## 2021-08-26 PROCEDURE — G8427 DOCREV CUR MEDS BY ELIG CLIN: HCPCS | Performed by: INTERNAL MEDICINE

## 2021-08-26 PROCEDURE — 99213 OFFICE O/P EST LOW 20 MIN: CPT | Performed by: INTERNAL MEDICINE

## 2021-08-26 PROCEDURE — G8417 CALC BMI ABV UP PARAM F/U: HCPCS | Performed by: INTERNAL MEDICINE

## 2021-08-26 PROCEDURE — 4040F PNEUMOC VAC/ADMIN/RCVD: CPT | Performed by: INTERNAL MEDICINE

## 2021-08-26 PROCEDURE — 3017F COLORECTAL CA SCREEN DOC REV: CPT | Performed by: INTERNAL MEDICINE

## 2021-08-26 PROCEDURE — 1123F ACP DISCUSS/DSCN MKR DOCD: CPT | Performed by: INTERNAL MEDICINE

## 2021-08-26 RX ORDER — ROSUVASTATIN CALCIUM 5 MG/1
TABLET, COATED ORAL
COMMUNITY
Start: 2021-05-26

## 2021-08-26 ASSESSMENT — ENCOUNTER SYMPTOMS
DIARRHEA: 1
RHINORRHEA: 0
EYE PAIN: 0
ABDOMINAL PAIN: 0
COLOR CHANGE: 0
STRIDOR: 0
APNEA: 1
SORE THROAT: 0
BLOOD IN STOOL: 0
CONSTIPATION: 0
COUGH: 1
EYE ITCHING: 0
WHEEZING: 0
TROUBLE SWALLOWING: 0
NAUSEA: 0
EYE DISCHARGE: 0
VOMITING: 0
FACIAL SWELLING: 0
SHORTNESS OF BREATH: 0
ANAL BLEEDING: 0
PHOTOPHOBIA: 0

## 2021-08-26 NOTE — PROGRESS NOTES
done.    He saw Dr. Chirag Acuña who did some type of procedure him which helped his BPH. He is no longer on Flomax    His wife notes that he has a chronic cough which is worse when he sleeps. He does not cough much during the day. He has not had any evaluation for this. Years ago his wife said the [facility] doctor told him he had a \"touch of emphysema\". Despite this patient continues to smoke a pack a day and has for many many years. We stopped his ARB for a while but this did not make any difference in his chronic cough. He is back on his losartan with no worsening of his cough. We discussed with him CT scan of his chest as a screen for lung cancer but patient refuses to have this done. Another problem is recurrent diarrhea. He did have some spasm on his colonoscopy. His wife says she has been trying to eliminate certain foods from his diet but nothing really works. He occasionally gets cramps with this. Stools sometimes soft and sometimes watery. There is no blood in his stool. He does not want referral to GI for follow-up on this. He says he can control it pretty much with Bentyl and avoiding foods that he knows causes diarrhea    He is refused further immunizations. Specifically refuses his COVID-19 vaccine. I talked to him about the necessity that he cannot improve his underlying lung disease and his other comorbidities. Patient still will not get this done. Reviewed medications. He did not take his duloxetine and the [facility] did start him on rosuvastatin 5 mg along with omega-3 fatty acids. His fasting triglycerides were over 400. They are going to recheck his lipids in September. I will get a copy of those results. Other medications remain the same. Review of Systems   Constitutional: Negative for appetite change, fatigue and unexpected weight change. HENT: Negative for congestion, ear pain, facial swelling, rhinorrhea, sore throat, tinnitus and trouble swallowing.     Eyes: Negative for photophobia, pain, discharge, itching and visual disturbance. Respiratory: Positive for apnea and cough. Negative for shortness of breath, wheezing and stridor. 10-15 seconds apneic spells, snoring, leg movement   Cardiovascular: Negative for chest pain, palpitations and leg swelling. Gastrointestinal: Positive for diarrhea. Negative for abdominal pain, anal bleeding, blood in stool, constipation, nausea and vomiting. Endocrine: Negative for cold intolerance, heat intolerance, polydipsia, polyphagia and polyuria. Genitourinary: Negative for difficulty urinating, dysuria, flank pain, frequency, hematuria and urgency. Musculoskeletal: Negative for arthralgias, gait problem, joint swelling and myalgias. Left hip pain   Skin: Negative for color change, pallor and rash. Allergic/Immunologic: Negative for environmental allergies and food allergies. Neurological: Positive for numbness. Negative for dizziness, tremors, seizures, syncope, speech difficulty, weakness, light-headedness and headaches. Mainly of his  fingers   Hematological: Negative for adenopathy. Does not bruise/bleed easily. Psychiatric/Behavioral: Positive for confusion. Negative for agitation, behavioral problems, sleep disturbance and suicidal ideas. The patient is not nervous/anxious.          Problems with memory          Current Outpatient Medications:     rosuvastatin (CRESTOR) 5 MG tablet, TAKE ONE-HALF TABLET BY MOUTH EVERY DAY, Disp: , Rfl:     amoxicillin (AMOXIL) 500 MG capsule, TAKE FOUR CAPSULES BY MOUTH ONE HOUR BEFORE APPOINTMENT, Disp: , Rfl:     dicyclomine (BENTYL) 10 MG capsule, Take 1 capsule by mouth 3 times daily as needed (diarrhea and cramps), Disp: 60 capsule, Rfl: 2    traZODone (DESYREL) 100 MG tablet, Take 200 mg by mouth nightly, Disp: , Rfl:     metoprolol tartrate (LOPRESSOR) 50 MG tablet, Take 50 mg by mouth daily , Disp: , Rfl:     losartan (COZAAR) 50 MG tablet, Take 50 mg by mouth daily, Disp: , Rfl:     amLODIPine (NORVASC) 10 MG tablet, Take 10 mg by mouth daily, Disp: , Rfl:     Omega-3 Fatty Acids (FISH OIL) 1000 MG CAPS, Take 2,000 mg by mouth daily, Disp: , Rfl:     Multiple Vitamins-Minerals (MULTI FOR HIM) PACK, Take by mouth 2 times daily, Disp: , Rfl:     rivaroxaban (XARELTO) 20 MG TABS tablet, Take 20 mg by mouth daily, Disp: , Rfl:     Ergocalciferol (VITAMIN D2) 2000 units TABS, Take by mouth daily, Disp: , Rfl:      Allergies   Allergen Reactions    Buchu-Cornsilk-Ch Grass-Hydran Other (See Comments)     delirium    Tricyclic Antidepressants Other (See Comments)     delirium    Furosemide     Gemfibrozil     Hydrochlorothiazide Other (See Comments) and Nausea Only     Other reaction(s): NAUSEA,VOMITING, Dizziness, Weakness present    Morphine     Sertraline      Other reaction(s): Dizziness    Statins     Sulfa Antibiotics     Citalopram Other (See Comments) and Nausea And Vomiting     Other reaction(s): NAUSEA,VOMITING, Dizziness, Weakness present        Past Medical History:   Diagnosis Date    Atrial fibrillation ([facility] [address]     Hyperlipidemia     Neuropathy     Osteoarthritis        Health Maintenance Due   Topic Date Due    Hepatitis C screen  Never done    Potassium monitoring  01/13/2021    Creatinine monitoring  01/13/2021    Annual Wellness Visit (AWV)  Never done        Patient Active Problem List   Diagnosis    Atrial fibrillation ([facility] [address]    Essential hypertension    Neuropathy    Osteoarthritis    Hypoxia, sleep related    LIZ (obstructive sleep apnea)    Functional diarrhea    Pure hypertriglyceridemia    Smokes tobacco daily    Personal history of noncompliance with medical treatment, presenting hazards to health    Normal colonoscopy    Major depressive disorder with single episode    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    Chronic obstructive lung disease (HCC)    Cognitive impairment        Past Surgical History:   Procedure Laterality Date    FOOT NEUROMA SURGERY Right     KNEE ARTHROPLASTY Bilateral     SHOULDER SURGERY Right     REMOVAL OF SPUR    TOTAL HIP ARTHROPLASTY      TUMOR EXCISION Right     WARTHINS TUMOR RIGHT NECK        Family History   Problem Relation Age of Onset    Colon Cancer Sister         Social History     Tobacco Use    Smoking status: Heavy Tobacco Smoker     Packs/day: 1.00     Years: 30.00     Pack years: 30.00     Types: Cigarettes     Start date: 1/23/1990    Smokeless tobacco: Never Used   Substance Use Topics    Alcohol use: Never    Drug use: Never        Objective  Vitals:    08/26/21 0704   BP: 138/85   Pulse: 76   Temp: 97.8 °F (36.6 °C)   TempSrc: Temporal   SpO2: 95%   Weight: 250 lb (113.4 kg)        Exam:  Physical Exam  Vitals reviewed. Constitutional:       General: He is not in acute distress. Appearance: He is well-developed. He is obese. Comments: Uses cane   HENT:      Head: Normocephalic and atraumatic. Right Ear: External ear normal.      Left Ear: External ear normal.   Eyes:      General: No scleral icterus. Right eye: No discharge. Left eye: No discharge. Extraocular Movements: Extraocular movements intact. Conjunctiva/sclera: Conjunctivae normal.   Neck:      Thyroid: No thyromegaly. Cardiovascular:      Rate and Rhythm: Normal rate. Rhythm irregular. Heart sounds: Normal heart sounds. No murmur heard. No friction rub. No gallop. Pulmonary:      Effort: Pulmonary effort is normal. No respiratory distress. Breath sounds: No wheezing or rales. Comments: Decreased breath sounds with occasional wheeze  Chest:      Chest wall: No tenderness. Abdominal:      General: Bowel sounds are normal. There is no distension. Palpations: Abdomen is soft. There is no mass. Tenderness: There is no abdominal tenderness. There is no guarding or rebound. Musculoskeletal:         General: No tenderness or deformity.  Normal range of motion. Cervical back: Normal range of motion and neck supple. Comments: Walks with a cane   Lymphadenopathy:      Cervical: No cervical adenopathy. Skin:     General: Skin is warm and dry. Coloration: Skin is not pale. Findings: No erythema or rash. Neurological:      General: No focal deficit present. Mental Status: He is alert and oriented to person, place, and time. Cranial Nerves: No cranial nerve deficit. Sensory: No sensory deficit. Gait: Gait normal.      Deep Tendon Reflexes: Reflexes normal.   Psychiatric:         Mood and Affect: Mood normal.         Behavior: Behavior normal.         Thought Content: Thought content normal.         Judgment: Judgment normal.          Last labs reviewed. ASSESSMENT & PLAN :   Problem List        Circulatory    Atrial fibrillation ([facility] [address]       continue Xarelto and follow-up with cardiology         Essential hypertension     Blood pressures are stable. Continue medications and monitor blood pressures at home. Call office if systolics are over 434 over diastolics over 90. Respiratory    LIZ (obstructive sleep apnea)       obtain CPAP machine and see if his cognition improves after 2 to 3 weeks of use         Chronic obstructive lung disease ([facility] [address] - Primary       check pulmonary function test with bronchodilator and see if he needs inhalers. Relevant Orders    Full PFT Study With Bronchodilator       Digestive    Functional diarrhea       use Bentyl as needed. Avoid trigger foods         Relevant Medications    dicyclomine (BENTYL) 10 MG capsule       Other    Pure hypertriglyceridemia       continue rosuvastatin and omega-3 fish oil.   Lipids to be checked by the [place] in September 2021         Relevant Medications    metoprolol tartrate (LOPRESSOR) 50 MG tablet    losartan (COZAAR) 50 MG tablet    amLODIPine (NORVASC) 10 MG tablet    rivaroxaban (XARELTO) 20 MG TABS tablet    rosuvastatin (CRESTOR) 5 MG tablet    Personal history of noncompliance with medical treatment, presenting hazards to health       nicotine cessation therapy offered. Patient refuses to stop smoking         Major depressive disorder with single episode       continue trazodone. Patient refuses duloxetine         Relevant Medications    traZODone (DESYREL) 100 MG tablet    Cognitive impairment       if no improvement in cognition after 2 to 3 weeks use of his CPAP wife is to call me and we will make referral to neurologist and get an MRI of his brain to look for possible multi-infarct dementia with his underlying atrial fibrillation. Return in about 3 months (around 11/26/2021), or copd , htn and AWV.        Serena Haque DO  8/26/2021

## 2021-08-26 NOTE — ASSESSMENT & PLAN NOTE
continue rosuvastatin and omega-3 fish oil.   Lipids to be checked by the [facility] in September 2021

## 2021-08-26 NOTE — ASSESSMENT & PLAN NOTE
Blood pressures are stable. Continue medications and monitor blood pressures at home. Call office if systolics are over 379 over diastolics over 90.

## 2021-08-26 NOTE — ASSESSMENT & PLAN NOTE
if no improvement in cognition after 2 to 3 weeks use of his CPAP wife is to call me and we will make referral to neurologist and get an MRI of his brain to look for possible multi-infarct dementia with his underlying atrial fibrillation.

## 2021-09-23 LAB
DLCO %PRED: 17.56 %
DLCO PRED: NORMAL
DLCO/VA %PRED: NORMAL
DLCO/VA PRED: NORMAL
DLCO/VA: NORMAL
DLCO: NORMAL
EXPIRATORY TIME-POST: NORMAL
EXPIRATORY TIME: NORMAL
FEF 25-75% %CHNG: NORMAL
FEF 25-75% %PRED-POST: NORMAL
FEF 25-75% %PRED-PRE: NORMAL
FEF 25-75% PRED: NORMAL
FEF 25-75%-POST: NORMAL
FEF 25-75%-PRE: NORMAL
FEV1 %PRED-POST: 2.4 %
FEV1 %PRED-PRE: 3.66 %
FEV1 PRED: NORMAL
FEV1-POST: NORMAL
FEV1-PRE: NORMAL
FEV1/FVC %PRED-POST: NORMAL
FEV1/FVC %PRED-PRE: NORMAL
FEV1/FVC PRED: NORMAL
FEV1/FVC-POST: 64 %
FEV1/FVC-PRE: 65 %
FVC %PRED-POST: NORMAL
FVC %PRED-PRE: NORMAL
FVC PRED: NORMAL
FVC-POST: NORMAL
FVC-PRE: NORMAL
GAW %PRED: NORMAL
GAW PRED: NORMAL
GAW: NORMAL
IC %PRED: NORMAL
IC PRED: NORMAL
IC: NORMAL
MEP: NORMAL
MIP: NORMAL
MVV %PRED-PRE: NORMAL
MVV PRED: NORMAL
MVV-PRE: NORMAL
PEF %PRED-POST: NORMAL
PEF %PRED-PRE: NORMAL
PEF PRED: NORMAL
PEF%CHNG: NORMAL
PEF-POST: NORMAL
PEF-PRE: NORMAL
RAW %PRED: NORMAL
RAW PRED: NORMAL
RAW: NORMAL
RV %PRED: NORMAL
RV PRED: NORMAL
RV: NORMAL
SVC %PRED: NORMAL
SVC PRED: NORMAL
SVC: NORMAL
TLC %PRED: 6.19 %
TLC PRED: NORMAL
TLC: NORMAL
VA %PRED: NORMAL
VA PRED: NORMAL
VA: NORMAL
VTG %PRED: NORMAL
VTG PRED: NORMAL
VTG: NORMAL

## 2021-09-23 ASSESSMENT — PULMONARY FUNCTION TESTS
FEV1_PERCENT_PREDICTED_POST: 2.4
FEV1/FVC_PRE: 65
FEV1/FVC_POST: 64
FEV1_PERCENT_PREDICTED_PRE: 3.66

## 2021-09-29 ENCOUNTER — TELEPHONE (OUTPATIENT)
Dept: PRIMARY CARE CLINIC | Age: 71
End: 2021-09-29

## 2021-09-29 NOTE — TELEPHONE ENCOUNTER
Pulmonary function tests show mild COPD.   But it has worsened compared to previous pulmonary function test.  If breathing gets worse he may need to be followed by a pulmonary doctor either here at the [place]

## 2021-12-02 ENCOUNTER — OFFICE VISIT (OUTPATIENT)
Dept: PRIMARY CARE CLINIC | Age: 71
End: 2021-12-02
Payer: MEDICARE

## 2021-12-02 ENCOUNTER — TELEPHONE (OUTPATIENT)
Dept: PRIMARY CARE CLINIC | Age: 71
End: 2021-12-02

## 2021-12-02 VITALS
WEIGHT: 256 LBS | HEIGHT: 73 IN | OXYGEN SATURATION: 94 % | DIASTOLIC BLOOD PRESSURE: 66 MMHG | SYSTOLIC BLOOD PRESSURE: 108 MMHG | HEART RATE: 86 BPM | BODY MASS INDEX: 33.93 KG/M2 | TEMPERATURE: 98.1 F

## 2021-12-02 VITALS
HEIGHT: 73 IN | OXYGEN SATURATION: 94 % | BODY MASS INDEX: 33.93 KG/M2 | SYSTOLIC BLOOD PRESSURE: 108 MMHG | TEMPERATURE: 98.1 F | WEIGHT: 256 LBS | DIASTOLIC BLOOD PRESSURE: 66 MMHG | HEART RATE: 86 BPM

## 2021-12-02 DIAGNOSIS — I10 ESSENTIAL HYPERTENSION: ICD-10-CM

## 2021-12-02 DIAGNOSIS — G47.33 OSA (OBSTRUCTIVE SLEEP APNEA): Primary | ICD-10-CM

## 2021-12-02 DIAGNOSIS — J44.9 CHRONIC OBSTRUCTIVE PULMONARY DISEASE, UNSPECIFIED COPD TYPE (HCC): ICD-10-CM

## 2021-12-02 DIAGNOSIS — Z00.00 ROUTINE GENERAL MEDICAL EXAMINATION AT A HEALTH CARE FACILITY: Primary | ICD-10-CM

## 2021-12-02 DIAGNOSIS — E78.1 PURE HYPERTRIGLYCERIDEMIA: ICD-10-CM

## 2021-12-02 DIAGNOSIS — F17.200 SMOKES TOBACCO DAILY: ICD-10-CM

## 2021-12-02 DIAGNOSIS — F32.89 OTHER DEPRESSION: ICD-10-CM

## 2021-12-02 PROBLEM — F32.A DEPRESSION: Status: ACTIVE | Noted: 2021-12-02

## 2021-12-02 PROCEDURE — 99213 OFFICE O/P EST LOW 20 MIN: CPT | Performed by: INTERNAL MEDICINE

## 2021-12-02 PROCEDURE — 3017F COLORECTAL CA SCREEN DOC REV: CPT | Performed by: INTERNAL MEDICINE

## 2021-12-02 PROCEDURE — G8926 SPIRO NO PERF OR DOC: HCPCS | Performed by: INTERNAL MEDICINE

## 2021-12-02 PROCEDURE — 4004F PT TOBACCO SCREEN RCVD TLK: CPT | Performed by: INTERNAL MEDICINE

## 2021-12-02 PROCEDURE — G0439 PPPS, SUBSEQ VISIT: HCPCS | Performed by: INTERNAL MEDICINE

## 2021-12-02 PROCEDURE — G8427 DOCREV CUR MEDS BY ELIG CLIN: HCPCS | Performed by: INTERNAL MEDICINE

## 2021-12-02 PROCEDURE — G8417 CALC BMI ABV UP PARAM F/U: HCPCS | Performed by: INTERNAL MEDICINE

## 2021-12-02 PROCEDURE — G8484 FLU IMMUNIZE NO ADMIN: HCPCS | Performed by: INTERNAL MEDICINE

## 2021-12-02 PROCEDURE — 3023F SPIROM DOC REV: CPT | Performed by: INTERNAL MEDICINE

## 2021-12-02 PROCEDURE — 1123F ACP DISCUSS/DSCN MKR DOCD: CPT | Performed by: INTERNAL MEDICINE

## 2021-12-02 PROCEDURE — 4040F PNEUMOC VAC/ADMIN/RCVD: CPT | Performed by: INTERNAL MEDICINE

## 2021-12-02 RX ORDER — LANOLIN ALCOHOL/MO/W.PET/CERES
3 CREAM (GRAM) TOPICAL DAILY
COMMUNITY

## 2021-12-02 RX ORDER — ASPIRIN 81 MG/1
TABLET ORAL
COMMUNITY
Start: 2021-11-04 | End: 2021-12-02 | Stop reason: ALTCHOICE

## 2021-12-02 SDOH — ECONOMIC STABILITY: FOOD INSECURITY: WITHIN THE PAST 12 MONTHS, THE FOOD YOU BOUGHT JUST DIDN'T LAST AND YOU DIDN'T HAVE MONEY TO GET MORE.: NEVER TRUE

## 2021-12-02 SDOH — ECONOMIC STABILITY: FOOD INSECURITY: WITHIN THE PAST 12 MONTHS, YOU WORRIED THAT YOUR FOOD WOULD RUN OUT BEFORE YOU GOT MONEY TO BUY MORE.: NEVER TRUE

## 2021-12-02 ASSESSMENT — ENCOUNTER SYMPTOMS
SHORTNESS OF BREATH: 0
EYE ITCHING: 0
ANAL BLEEDING: 0
COUGH: 1
DIARRHEA: 1
WHEEZING: 0
COLOR CHANGE: 0
VOMITING: 0
BLOOD IN STOOL: 0
EYE DISCHARGE: 0
NAUSEA: 0
TROUBLE SWALLOWING: 0
STRIDOR: 0
ABDOMINAL PAIN: 0
CONSTIPATION: 0
SORE THROAT: 0
APNEA: 1
PHOTOPHOBIA: 0
RHINORRHEA: 0
FACIAL SWELLING: 0
EYE PAIN: 0

## 2021-12-02 ASSESSMENT — LIFESTYLE VARIABLES: HOW OFTEN DO YOU HAVE A DRINK CONTAINING ALCOHOL: 0

## 2021-12-02 ASSESSMENT — SOCIAL DETERMINANTS OF HEALTH (SDOH): HOW HARD IS IT FOR YOU TO PAY FOR THE VERY BASICS LIKE FOOD, HOUSING, MEDICAL CARE, AND HEATING?: NOT VERY HARD

## 2021-12-02 ASSESSMENT — PATIENT HEALTH QUESTIONNAIRE - PHQ9
SUM OF ALL RESPONSES TO PHQ QUESTIONS 1-9: 1
1. LITTLE INTEREST OR PLEASURE IN DOING THINGS: 0
SUM OF ALL RESPONSES TO PHQ QUESTIONS 1-9: 1
2. FEELING DOWN, DEPRESSED OR HOPELESS: 1
SUM OF ALL RESPONSES TO PHQ QUESTIONS 1-9: 1
SUM OF ALL RESPONSES TO PHQ9 QUESTIONS 1 & 2: 1

## 2021-12-02 NOTE — PATIENT INSTRUCTIONS
Learning About Low-Carbohydrate Diets  What is a low-carbohydrate diet? A low-carbohydrate (or \"low-carb\") diet limits foods and drinks that have carbohydrates. This includes grains, fruits, milk and yogurt, and starchy vegetables like potatoes, beans, and corn. It also avoids foods and drinks that have added sugar. Instead, low-carb diets include foods that are high in protein and fat. Why might you follow a low-carb diet? Low-carb diets may be used for a variety of reasons, such as for weight loss. People who have diabetes may use a low-carb diet to help manage their blood sugar levels. What should you do before you start the diet? Talk to your doctor before you try any diet. This is even more important if you have health problems like kidney disease, heart disease, or diabetes. Your doctor may suggest that you meet with a registered dietitian. A dietitian can help you make an eating plan that works for you. What foods do you eat on a low-carb diet? On a low-carb diet, you choose foods that are high in protein and fat. Examples of these are:  · Meat, poultry, and fish. · Eggs. · Nuts, such as walnuts, pecans, almonds, and peanuts. · Peanut butter and other nut butters. · Tofu. · Avocado. · Jayda Nova. · Non-starchy vegetables like broccoli, cauliflower, green beans, mushrooms, peppers, lettuce, and spinach. · Unsweetened non-dairy milks like almond milk and coconut milk. · Cheese, cottage cheese, and cream cheese. Current as of: December 17, 2020               Content Version: 13.0  © 2006-2021 Healthwise, [employer]. Care instructions adapted under license by [facility] [place] ([facility]). If you have questions about a medical condition or this instruction, always ask your healthcare professional. [address] any warranty or liability for your use of this information. Learning About Benefits From Quitting Smoking  How does quitting smoking make you healthier?      If you're thinking about quitting smoking, you may have a few reasons to be smoke-free. Your health may be one of them. · When you quit smoking, you lower your risks for cancer, lung disease, heart attack, stroke, blood vessel disease, and blindness from macular degeneration. · When you're smoke-free, you get sick less often, and you heal faster. You are less likely to get colds, flu, bronchitis, and pneumonia. · As a nonsmoker, you may find that your mood is better and you are less stressed. When and how will you feel healthier? Quitting has real health benefits that start from day 1 of being smoke-free. And the longer you stay smoke-free, the healthier you get and the better you feel. The first hours  · After just 20 minutes, your blood pressure and heart rate go down. That means there's less stress on your heart and blood vessels. · Within 12 hours, the level of carbon monoxide in your blood drops back to normal. That makes room for more oxygen. With more oxygen in your body, you may notice that you have more energy than when you smoked. After 2 weeks  · Your lungs start to work better. · Your risk of heart attack starts to drop. After 1 month  · When your lungs are clear, you cough less and breathe deeper, so it's easier to be active. · Your sense of taste and smell return. That means you can enjoy food more than you have since you started smoking. Over the years  · Over the years, your risks of heart disease, heart attack, and stroke are lower. · After 10 years, your risk of dying from lung cancer is cut by about half. And your risk for many other types of cancer is lower too. How would quitting help others in your life? When you quit smoking, you improve the health of everyone who now breathes in your smoke. · Their heart, lung, and cancer risks drop, much like yours. · They are sick less.  For babies and small children, living smoke-free means they're less likely to have ear infections, pneumonia, and bronchitis. · If you're a woman who is or will be pregnant someday, quitting smoking means a healthier . · Children who are close to you are less likely to become adult smokers. Where can you learn more? Go to https://chgrey.[employer]. org and sign in to your Integration Management account. Enter [phone] [id number] [phone] in the [facility] box to learn more about \"Learning About Benefits From Quitting Smoking. \"     If you do not have an account, please click on the \"Sign Up Now\" link. Current as of: 2021               Content Version: 13.0  © 3712-5615 Healthwise, [employer]. Care instructions adapted under license by [facility] ([facility]). If you have questions about a medical condition or this instruction, always ask your healthcare professional. [address] any warranty or liability for your use of this information. Personalized Preventive Plan for Toni Keto - 2021  Medicare offers a range of preventive health benefits. Some of the tests and screenings are paid in full while other may be subject to a deductible, co-insurance, and/or copay. Some of these benefits include a comprehensive review of your medical history including lifestyle, illnesses that may run in your family, and various assessments and screenings as appropriate. After reviewing your medical record and screening and assessments performed today your provider may have ordered immunizations, labs, imaging, and/or referrals for you. A list of these orders (if applicable) as well as your Preventive Care list are included within your After Visit Summary for your review. Other Preventive Recommendations:    · A preventive eye exam performed by an eye specialist is recommended every 1-2 years to screen for glaucoma; cataracts, macular degeneration, and other eye disorders. · A preventive dental visit is recommended every 6 months.   · Try to get at least 150 minutes of exercise per week or 10,000 steps per day on a pedometer . · Order or download the FREE \"Exercise & Physical Activity: Your Everyday Guide\" from [employer]. Call 8-100.387.5742 or search [employer] online. · You need 7687-8504 mg of calcium and 2343-6511 IU of vitamin D per day. It is possible to meet your calcium requirement with diet alone, but a vitamin D supplement is usually necessary to meet this goal.  · When exposed to the sun, use a sunscreen that protects against both UVA and UVB radiation with an SPF of 30 or greater. Reapply every 2 to 3 hours or after sweating, drying off with a towel, or swimming. · Always wear a seat belt when traveling in a car. Always wear a helmet when riding a bicycle or motorcycle.

## 2021-12-02 NOTE — ASSESSMENT & PLAN NOTE
recommend very strongly that he quit smoking as this would help his symptoms of COPD. Patient refuses to quit smoking. He refuses the use of a rescue inhaler.  Await results of pulmonary function test done recently

## 2021-12-02 NOTE — ASSESSMENT & PLAN NOTE
Blood pressures are stable. Continue medications and monitor blood pressures at home. Call office if systolics are over 023 over diastolics over 90. continue amlodipine, losartan and metoprolol.  Monitor blood pressures

## 2021-12-02 NOTE — PROGRESS NOTES
Medicare Annual Wellness Visit  Name: Abby Stevens Date: 2021   MRN: <[id number]> Sex: Male   Age: 70 y.o. Ethnicity: Non- / Non    : 1950 Race: White (non-)      Shawna Beck is here for Medicare AWV    Screenings for behavioral, psychosocial and functional/safety risks, and cognitive dysfunction are all negative except as indicated below. These results, as well as other patient data from the [address] form, are documented in Flowsheets linked to this Encounter. Allergies   Allergen Reactions    Buchu-Cornsilk-Ch Grass-Hydran Other (See Comments)     delirium    Tricyclic Antidepressants Other (See Comments)     delirium    Furosemide     Gemfibrozil     Hydrochlorothiazide Other (See Comments) and Nausea Only     Other reaction(s): NAUSEA,VOMITING, Dizziness, Weakness present    Morphine     Sertraline      Other reaction(s): Dizziness    Statins     Sulfa Antibiotics     Citalopram Other (See Comments) and Nausea And Vomiting     Other reaction(s): NAUSEA,VOMITING, Dizziness, Weakness present       Prior to Visit Medications    Medication Sig Taking?  Authorizing Provider   melatonin 3 MG TABS tablet Take 3 mg by mouth daily Yes Historical Provider, MD   rosuvastatin (CRESTOR) 5 MG tablet TAKE ONE-HALF TABLET BY MOUTH EVERY DAY Yes Historical Provider, MD   amoxicillin (AMOXIL) 500 MG capsule TAKE FOUR CAPSULES BY MOUTH ONE HOUR BEFORE APPOINTMENT Yes Historical Provider, MD   dicyclomine (BENTYL) 10 MG capsule Take 1 capsule by mouth 3 times daily as needed (diarrhea and cramps) Yes Windy Cedillo DO   traZODone (DESYREL) 100 MG tablet Take 200 mg by mouth nightly Yes Historical Provider, MD   metoprolol tartrate (LOPRESSOR) 50 MG tablet Take 50 mg by mouth daily  Yes Historical Provider, MD   losartan (COZAAR) 50 MG tablet Take 50 mg by mouth daily Yes Historical Provider, MD   amLODIPine (NORVASC) 10 MG tablet Take 10 mg by mouth daily Yes Historical Provider, MD   Omega-3 Fatty Acids (FISH OIL) 1000 MG CAPS Take 2,000 mg by mouth daily Yes Historical Provider, MD   Multiple Vitamins-Minerals (MULTI FOR HIM) PACK Take by mouth 2 times daily Yes Historical Provider, MD   rivaroxaban (XARELTO) 20 MG TABS tablet Take 20 mg by mouth daily Yes Historical Provider, MD   Ergocalciferol (VITAMIN D2) 2000 units TABS Take by mouth daily Yes Historical Provider, MD       Past Medical History:   Diagnosis Date    Atrial fibrillation ([address]     Hyperlipidemia     Neuropathy     Osteoarthritis        Past Surgical History:   Procedure Laterality Date    FOOT NEUROMA SURGERY Right     KNEE ARTHROPLASTY Bilateral     SHOULDER SURGERY Right     REMOVAL OF SPUR    TOTAL HIP ARTHROPLASTY      TUMOR EXCISION Right     WARTHINS TUMOR RIGHT NECK       Family History   Problem Relation Age of Onset    Colon Cancer Sister        CareTeam (Including outside providers/suppliers regularly involved in providing care):   Patient Care Team:  [address] [place] [address] as PCP - General (Internal Medicine)  [address] [place] [address] as PCP - [facility] Empaneled Provider    Wt Readings from Last 3 Encounters:   12/02/21 256 lb (116.1 kg)   12/02/21 256 lb (116.1 kg)   08/26/21 250 lb (113.4 kg)     Vitals:    12/02/21 0935   BP: 108/66   Pulse: 86   Temp: 98.1 °F (36.7 °C)   SpO2: 94%   Weight: 256 lb (116.1 kg)   Height: 6' 1\" (1.854 m)     Body mass index is 33.78 kg/m². Based upon direct observation of the patient, evaluation of cognition reveals recent and remote memory intact. Patient's complete Health Risk Assessment and screening values have been reviewed and are found in Flowsheets. The following problems were reviewed today and where indicated follow up appointments were made and/or referrals ordered.     Positive Risk Factor Screenings with Interventions:         Substance History:  Social History     Tobacco History     Smoking Status  Heavy Tobacco Smoker Smoking Start Date  1/23/1990 Smoking Frequency  1 pack/day for 30 years (30 pk yrs) Smoking Tobacco Type  Cigarettes    Smokeless Tobacco Use  Never Used          Alcohol History     Alcohol Use Status  Never          Drug Use     Drug Use Status  Never          Sexual Activity     Sexually Active  Yes Partners  Female               Alcohol Screening:       A score of 8 or more is associated with harmful or hazardous drinking. A score of 13 or more in women, and 15 or more in men, is likely to indicate alcohol dependence. Substance Abuse Interventions:  · Alcohol misuse/dependence:  patient does not use alcohol   · Patient refuses to quit smoking    General Health and ACP:  General  In general, how would you say your health is?: Good  In the past 7 days, have you experienced any of the following? New or Increased Pain, New or Increased Fatigue, Loneliness, Social Isolation, Stress or Anger?: (!) Stress  Do you get the social and emotional support that you need?: Yes  Do you have a Living Will?: Yes  Advance Directives     Power of  Living Will ACP-Advance Directive ACP-Power of     Not on File Not on File Not on File Not on File      General Health Risk Interventions:  · No Living Will: ACP documents already completed- patient asked to provide copy to the office   · Patient prays a lot    Health Habits/Nutrition:  Health Habits/Nutrition  Do you exercise for at least 20 minutes 2-3 times per week?: (!) No  Have you lost any weight without trying in the past 3 months?: No  Do you eat only one meal per day?: No  Have you seen the dentist within the past year?: Yes  Body mass index: (!) 33.77  Health Habits/Nutrition Interventions:  · Inadequate physical activity:  unable to exercise because of medical condition and knee pain      ADL:  ADLs  In the past 7 days, did you need help from others to perform any of the following everyday activities?  Eating, dressing, grooming, bathing, toileting, or walking/balance?: (!) Bathing  In the past 7 days, did you need help from others to take care of any of the following? Laundry, housekeeping, banking/finances, shopping, telephone use, food preparation, transportation, or taking medications?: None  ADL Interventions:  · Patient declines any further evaluation/treatment for this issue   · Wife helps with bathing    Personalized Preventive Plan   Current Health Maintenance Status  Immunization History   Administered Date(s) Administered    Anthrax (BioThrax) 03/11/2003    Hepatitis A Adult (Havrix, Vaqta) 01/25/2003    Hepatitis B 01/25/2003    Influenza Virus Vaccine 11/21/2003    MMR 07/14/2002    Meningococcal MPSV4 (Menomune) 01/25/2003    Polio IPV (IPOL) 07/14/2002    Td, unspecified formulation 01/25/2003    Typhoid Vi capsular polysaccharide (Typhim VI) 01/25/2003    Vaccinia - Smallpox ([id number]) 01/25/2003    Yellow Fever (YF-Vax) 01/25/2003        Health Maintenance   Topic Date Due    Hepatitis C screen  Never done    Potassium monitoring  01/13/2021    Creatinine monitoring  01/13/2021    Flu vaccine (1) 09/01/2021    COVID-19 Vaccine (1) 08/16/2022 (Originally 1/19/1962)    Pneumococcal 65+ years Vaccine (1 of 1 - PPSV23) 08/17/2022 (Originally 1/19/2015)    DTaP/Tdap/Td vaccine (1 - Tdap) 08/26/2022 (Originally 1/26/2003)    Shingles Vaccine (1 of 2) 08/26/2022 (Originally 1/19/2000)    AAA screen  08/26/2022 (Originally 1950)    Lipid screen  02/11/2022    Colon cancer screen colonoscopy  01/18/2031    Hepatitis A vaccine  Aged Out    Hepatitis B vaccine  Aged Out    Hib vaccine  Aged Out    Meningococcal (ACWY) vaccine  Aged Out    Low dose CT lung screening  Discontinued     Recommendations for Preventive Services Due: see orders and patient instructions/AVS.  .   Recommended screening schedule for the next 5-10 years is provided to the patient in written form: see Patient Instructions/AVS.    There are no diagnoses linked to this encounter. Cardiovascular Disease Risk Counseling: Assessed the patient's risk to develop cardiovascular disease and reviewed main risk factors. Reviewed steps to reduce disease risk including:   · Quitting tobacco use, reducing amount smoked, or not starting the habit  · Making healthy food choices  · Being physically active and gradualy increasing activity levels   · Reduce weight and determine a healthy BMI goal  · Monitor blood pressure and treat if higher than 140/90 mmHg  · Maintain blood total cholesterol levels under 5 mmol/l or 190 mg/dl  · Maintain LDL cholesterol levels under 3.0 mmol/l or 115 mg/dl   · Control blood glucose levels  · Consider taking aspirin (75 mg daily), once blood pressure is controlled   Provided a follow up plan.   Time spent (minutes): 6

## 2021-12-02 NOTE — TELEPHONE ENCOUNTER
Get last blood work and office visit from [facility] report of PFTs done at [facility] this year

## 2021-12-02 NOTE — PROGRESS NOTES
2021    Name: Diego Marie : 1950 Sex: male  Age: 70 y.o. Subjective:  Chief Complaint   Patient presents with    Hypertension     3 month visit        Hypertension  Pertinent negatives include no chest pain, headaches, palpitations or shortness of breath. He has a history of chronic atrial fibrillation on Xarelto. Sees Dr. Melvina De Guzman. He has a history of hypertension, BPH, neuropathy, PTSD and hyperlipidemia. Colonoscopy was done by [facility] on 2021. This did not show any polyps. Previous colonoscopy in 2018 showed tubular adenomas. He did have internal hemorrhoids and moderate diverticulosis. Recommend recheck colonoscopy in 5 years. He also recommended yearly checks for occult blood starting in 2 years. Blood work was done at the [place] [facility] in [facility]. We will get results. Previous blood work in the spring 2020 showed hemoglobin of 11.2 and hematocrit 35.8. He says he has occasional bleeding from his rectum when his hemorrhoids \"act up\". He saw Dr. Mindy Eli this year for his eye examination    He was sent to the [place] for evaluation for possible sleep apnea. They did a sleep study on him which showed an AHI of about 80 per wife. She also said that 20% of the study showed \"central apnea\". We will have to get the actual study results from the [place]. The got him a CPAP machine which he will be fitted for on 2021. Wife talks about his worsening memory problems, he has decreased concentration and is occasionally confused. His sleep studies showed an AHI of four over 80/h. The gave him a CPAP machine and he has tried a couple of masks but nothing seems to work pretty has nocturia and has urgency and it is a problem getting the apparatus off before he gets to the bathroom. I have asked him to check with him again about different types of mask and if nothing works he may be a candidate for [employer] therapy.      His chest x-ray did not show any nodules he had COPD changes. He had pulmonary function tests and chest x-ray done at [facility] within the last couple of months but I do not have the results. We'll get them. We talked about a rescue inhaler when he gets short of breath and refuses to use one    He saw Korina Chinchilla nurse practitioner Dr. Brady Rojas office. In October 2021. Reviewed his report his PSA had decreased to 6.63. Previous biopsy in April 2021 was negative for malignancy    Another problem is recurrent diarrhea. He did have some spasm on his colonoscopy. His wife says she has been trying to eliminate certain foods from his diet but nothing really works. He occasionally gets cramps with this. Stools sometimes soft and sometimes watery. There is no blood in his stool. He does not want referral to GI for follow-up on this. He says he can control it pretty much with Bentyl and avoiding foods that he knows causes diarrhea    He is refused further immunizations. Specifically refuses his COVID-19 vaccine. I talked to him about the necessity that he cannot improve his underlying lung disease and his other comorbidities. Patient still will not get this done. Patient refuses a flu vaccine    Reviewed medications. He did not take his duloxetine and the [place] did start him on rosuvastatin 5 mg along with omega-3 fatty acids. His fasting triglycerides were over 400. They are going to recheck his ,  I will get a copy of those results. Other medications remain the same. Review of Systems   Constitutional: Negative for appetite change, fatigue and unexpected weight change. HENT: Negative for congestion, ear pain, facial swelling, rhinorrhea, sore throat, tinnitus and trouble swallowing. Eyes: Negative for photophobia, pain, discharge, itching and visual disturbance. Respiratory: Positive for apnea and cough. Negative for shortness of breath, wheezing and stridor.          10-15 seconds apneic spells, snoring, leg movement Cardiovascular: Negative for chest pain, palpitations and leg swelling. Gastrointestinal: Positive for diarrhea. Negative for abdominal pain, anal bleeding, blood in stool, constipation, nausea and vomiting. Endocrine: Negative for cold intolerance, heat intolerance, polydipsia, polyphagia and polyuria. Genitourinary: Negative for difficulty urinating, dysuria, flank pain, frequency, hematuria and urgency. Musculoskeletal: Negative for arthralgias, gait problem, joint swelling and myalgias. Left hip pain   Skin: Negative for color change, pallor and rash. Allergic/Immunologic: Negative for environmental allergies and food allergies. Neurological: Positive for numbness. Negative for dizziness, tremors, seizures, syncope, speech difficulty, weakness, light-headedness and headaches. Mainly of his  fingers   Hematological: Negative for adenopathy. Does not bruise/bleed easily. Psychiatric/Behavioral: Positive for confusion. Negative for agitation, behavioral problems, sleep disturbance and suicidal ideas. The patient is not nervous/anxious.          Problems with memory          Current Outpatient Medications:     melatonin 3 MG TABS tablet, Take 3 mg by mouth daily, Disp: , Rfl:     rosuvastatin (CRESTOR) 5 MG tablet, TAKE ONE-HALF TABLET BY MOUTH EVERY DAY, Disp: , Rfl:     amoxicillin (AMOXIL) 500 MG capsule, TAKE FOUR CAPSULES BY MOUTH ONE HOUR BEFORE APPOINTMENT, Disp: , Rfl:     dicyclomine (BENTYL) 10 MG capsule, Take 1 capsule by mouth 3 times daily as needed (diarrhea and cramps), Disp: 60 capsule, Rfl: 2    traZODone (DESYREL) 100 MG tablet, Take 200 mg by mouth nightly, Disp: , Rfl:     metoprolol tartrate (LOPRESSOR) 50 MG tablet, Take 50 mg by mouth daily , Disp: , Rfl:     losartan (COZAAR) 50 MG tablet, Take 50 mg by mouth daily, Disp: , Rfl:     amLODIPine (NORVASC) 10 MG tablet, Take 10 mg by mouth daily, Disp: , Rfl:     Omega-3 Fatty Acids (FISH OIL) 1000 MG CAPS, Take 2,000 mg by mouth daily, Disp: , Rfl:     Multiple Vitamins-Minerals (MULTI FOR HIM) PACK, Take by mouth 2 times daily, Disp: , Rfl:     rivaroxaban (XARELTO) 20 MG TABS tablet, Take 20 mg by mouth daily, Disp: , Rfl:     Ergocalciferol (VITAMIN D2) 2000 units TABS, Take by mouth daily, Disp: , Rfl:      Allergies   Allergen Reactions    Buchu-Cornsilk-Ch Grass-Hydran Other (See Comments)     delirium    Tricyclic Antidepressants Other (See Comments)     delirium    Furosemide     Gemfibrozil     Hydrochlorothiazide Other (See Comments) and Nausea Only     Other reaction(s): NAUSEA,VOMITING, Dizziness, Weakness present    Morphine     Sertraline      Other reaction(s): Dizziness    Statins     Sulfa Antibiotics     Citalopram Other (See Comments) and Nausea And Vomiting     Other reaction(s): NAUSEA,VOMITING, Dizziness, Weakness present        Past Medical History:   Diagnosis Date    Atrial fibrillation ([facility] [address]     Hyperlipidemia     Neuropathy     Osteoarthritis        Health Maintenance Due   Topic Date Due    Hepatitis C screen  Never done    Potassium monitoring  01/13/2021    Creatinine monitoring  01/13/2021        Patient Active Problem List   Diagnosis    Atrial fibrillation (HCC)    Essential hypertension    Neuropathy    Osteoarthritis    Hypoxia, sleep related    LIZ (obstructive sleep apnea)    Functional diarrhea    Pure hypertriglyceridemia    Smokes tobacco daily    Personal history of noncompliance with medical treatment, presenting hazards to health    Normal colonoscopy    Major depressive disorder with single episode    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    Chronic obstructive lung disease (HCC)    Cognitive impairment    Depression        Past Surgical History:   Procedure Laterality Date    FOOT NEUROMA SURGERY Right     KNEE ARTHROPLASTY Bilateral     SHOULDER SURGERY Right     REMOVAL OF SPUR    TOTAL HIP ARTHROPLASTY      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 Exam conducted with a chaperone present. Constitutional:       General: He is not in acute distress. Appearance: He is well-developed. He is obese. Comments: Uses a walker   HENT:      Head: Normocephalic and atraumatic. Right Ear: External ear normal.      Left Ear: External ear normal.   Eyes:      General: No scleral icterus. Right eye: No discharge. Left eye: No discharge. Extraocular Movements: Extraocular movements intact. Conjunctiva/sclera: Conjunctivae normal.   Neck:      Thyroid: No thyromegaly. Cardiovascular:      Rate and Rhythm: Normal rate. Rhythm irregular. Heart sounds: Normal heart sounds. No murmur heard. No friction rub. No gallop. Pulmonary:      Effort: Pulmonary effort is normal. No respiratory distress. Breath sounds: No wheezing or rales. Comments: Decreased breath sounds with occasional wheeze  Chest:      Chest wall: No tenderness. Abdominal:      General: Bowel sounds are normal. There is no distension. Palpations: Abdomen is soft. There is no mass. Tenderness: There is no abdominal tenderness. There is no guarding or rebound. Musculoskeletal:         General: No tenderness or deformity. Normal range of motion. Cervical back: Normal range of motion and neck supple.       Comments: Walks with a cane   Lymphadenopathy: tablet           Return in about 3 months (around 3/2/2022), or copd,hl.        Dahiana Hui,   12/2/2021

## 2021-12-30 ENCOUNTER — OFFICE VISIT (OUTPATIENT)
Dept: FAMILY MEDICINE CLINIC | Age: 71
End: 2021-12-30
Payer: MEDICARE

## 2021-12-30 VITALS
BODY MASS INDEX: 33.93 KG/M2 | HEIGHT: 73 IN | RESPIRATION RATE: 18 BRPM | WEIGHT: 256 LBS | HEART RATE: 77 BPM | OXYGEN SATURATION: 93 % | SYSTOLIC BLOOD PRESSURE: 110 MMHG | TEMPERATURE: 98.6 F | DIASTOLIC BLOOD PRESSURE: 70 MMHG

## 2021-12-30 DIAGNOSIS — R05.9 COUGH: Primary | ICD-10-CM

## 2021-12-30 LAB
Lab: ABNORMAL
PERFORMING INSTRUMENT: ABNORMAL
QC PASS/FAIL: ABNORMAL
SARS-COV-2, POC: DETECTED

## 2021-12-30 PROCEDURE — G8417 CALC BMI ABV UP PARAM F/U: HCPCS | Performed by: FAMILY MEDICINE

## 2021-12-30 PROCEDURE — 87426 SARSCOV CORONAVIRUS AG IA: CPT | Performed by: FAMILY MEDICINE

## 2021-12-30 PROCEDURE — 3017F COLORECTAL CA SCREEN DOC REV: CPT | Performed by: FAMILY MEDICINE

## 2021-12-30 PROCEDURE — 4040F PNEUMOC VAC/ADMIN/RCVD: CPT | Performed by: FAMILY MEDICINE

## 2021-12-30 PROCEDURE — G8484 FLU IMMUNIZE NO ADMIN: HCPCS | Performed by: FAMILY MEDICINE

## 2021-12-30 PROCEDURE — 4004F PT TOBACCO SCREEN RCVD TLK: CPT | Performed by: FAMILY MEDICINE

## 2021-12-30 PROCEDURE — 1123F ACP DISCUSS/DSCN MKR DOCD: CPT | Performed by: FAMILY MEDICINE

## 2021-12-30 PROCEDURE — 99214 OFFICE O/P EST MOD 30 MIN: CPT | Performed by: FAMILY MEDICINE

## 2021-12-30 PROCEDURE — G8427 DOCREV CUR MEDS BY ELIG CLIN: HCPCS | Performed by: FAMILY MEDICINE

## 2021-12-30 NOTE — PROGRESS NOTES
21  Jordan Palacio : 1950 Sex: male  Age: 70 y.o. Assessment and Plan:  Kalee Mueller was seen today for cough and congestion. Diagnoses and all orders for this visit:    Cough  -     POCT COVID-19, Antigen    Rapid COVID positive  Lack of validated outpatient treatments readily available at this time discussed with patient   Wife wants monoclonal antibody treatment  Reviewed that his may not be effective against Omicron - they understand and still wish to proceed  Paperwork for [facility] completed   Supportive care otherwise explicitly reviewed  Updated CDC recommendations for isolation reviewed with patient, who expressed good verbal understanding  Pt advised to seek medical care immediately for worsening sx       Return if symptoms worsen or fail to improve. Chief Complaint   Patient presents with    Cough    Congestion       HPI  Pt here for acute concerns  Wife is COVID positive  Last night he was coughing a lot and it woke his wife up   O2 was 83% and his fever was 101.4  EMS was called, pt refused transport to ER   He states he feels fine, and will not go to ER  Denies any SOB  Denies feeling poorly at all   Rapid COVID positive   He is not vaccinated   +Underlying COPD  Pt's wife wants him to have infusion  We discussed that it might not be efficacious against the Omecron variant, but not way to know what         Problem list reviewed and updated in full with patient today as necessary. A comprehensive ROS was negative, except as documented above.          Current Outpatient Medications:     melatonin 3 MG TABS tablet, Take 3 mg by mouth daily, Disp: , Rfl:     rosuvastatin (CRESTOR) 5 MG tablet, TAKE ONE-HALF TABLET BY MOUTH EVERY DAY, Disp: , Rfl:     amoxicillin (AMOXIL) 500 MG capsule, TAKE FOUR CAPSULES BY MOUTH ONE HOUR BEFORE APPOINTMENT, Disp: , Rfl:     dicyclomine (BENTYL) 10 MG capsule, Take 1 capsule by mouth 3 times daily as needed (diarrhea and cramps), Disp: 60 capsule, Rfl: 2    traZODone (DESYREL) 100 MG tablet, Take 200 mg by mouth nightly, Disp: , Rfl:     metoprolol tartrate (LOPRESSOR) 50 MG tablet, Take 50 mg by mouth daily , Disp: , Rfl:     losartan (COZAAR) 50 MG tablet, Take 50 mg by mouth daily, Disp: , Rfl:     amLODIPine (NORVASC) 10 MG tablet, Take 10 mg by mouth daily, Disp: , Rfl:     Omega-3 Fatty Acids (FISH OIL) 1000 MG CAPS, Take 2,000 mg by mouth daily, Disp: , Rfl:     Multiple Vitamins-Minerals (MULTI FOR HIM) PACK, Take by mouth 2 times daily, Disp: , Rfl:     rivaroxaban (XARELTO) 20 MG TABS tablet, Take 20 mg by mouth daily, Disp: , Rfl:     Ergocalciferol (VITAMIN D2) 2000 units TABS, Take by mouth daily, Disp: , Rfl:   Allergies   Allergen Reactions    Buchu-Cornsilk-Ch Grass-Hydran Other (See Comments)     delirium    Tricyclic Antidepressants Other (See Comments)     delirium    Furosemide     Gemfibrozil     Hydrochlorothiazide Other (See Comments) and Nausea Only     Other reaction(s): NAUSEA,VOMITING, Dizziness, Weakness present    Morphine     Sertraline      Other reaction(s): Dizziness    Statins     Sulfa Antibiotics     Citalopram Other (See Comments) and Nausea And Vomiting     Other reaction(s): NAUSEA,VOMITING, Dizziness, Weakness present       Pt's past medical and surgical history were reviewed and updated as necessary today   Pt's family and social history were reviewed and updated as necessary today        Vitals:    12/30/21 1324   BP: 110/70   Pulse: 77   Resp: 18   Temp: 98.6 °F (37 °C)   TempSrc: Temporal   SpO2: 93%   Weight: 256 lb (116.1 kg)   Height: 6' 1\" (1.854 m)       Physical Exam  Constitutional:       Appearance: Normal appearance. HENT:      Head: Normocephalic and atraumatic. Eyes:      Conjunctiva/sclera: Conjunctivae normal.   Cardiovascular:      Rate and Rhythm: Normal rate. Rhythm irregular. Heart sounds: Normal heart sounds.    Pulmonary:      Effort: Pulmonary effort is normal.   Abdominal:      Palpations: Abdomen is soft. Tenderness: There is no abdominal tenderness. Musculoskeletal:         General: Normal range of motion. Skin:     General: Skin is warm and dry. Neurological:      General: No focal deficit present. Mental Status: He is alert and oriented to person, place, and time. Psychiatric:         Mood and Affect: Mood normal.         Behavior: Behavior normal.        Counseled patient as appropriate and relevant regarding above diagnosis, including possible risks and complications, especially if left uncontrolled. Counseled patient as appropriate and relevant regarding any  possible side effects, risks, and alternatives to treatment; patient and/or guardian verbalizes understanding, and is in agreement with the plan as detailed above. Reviewed age and gender appropriate health screening exams and vaccinations. Advised patient regarding importance of keeping up with recommended health maintenance and to schedule as soon as possible if overdue, as this is important in assessing for undiagnosed pathology, especially cancer, as well as protecting against potentially harmful/life threatening disease. If discussed, any educational materials and/or home exercises printed for patient's review and were included in patient instructions on his/her After Visit Summary and given to patient at the end of visit. Advised patient to call with any new medication issues, and and other concerns/complaints prior to scheduled follow up. All questions answered to the patient's satisfaction.         Seen By:  Stephanie Anguiano MD

## 2022-01-03 ENCOUNTER — TELEPHONE (OUTPATIENT)
Dept: PRIMARY CARE CLINIC | Age: 72
End: 2022-01-03

## 2022-01-03 NOTE — TELEPHONE ENCOUNTER
----- Message from Kelsey Mccord sent at 1/3/2022 10:43 AM EST -----  Subject: Message to Provider    QUESTIONS  Information for Provider? Patient has tested positive for Covid on   12/29/21. Patient has been seen in ED, and was given Infusion for Covid   antibodies. The patient is experiencing cough and SOB and diarrhea. Patient is not sure what to do. Please call the patient to advise.   ---------------------------------------------------------------------------  --------------  CALL BACK INFO  What is the best way for the office to contact you? OK to leave message on   voicemail  Preferred Call Back Phone Number? [phone]  ---------------------------------------------------------------------------  --------------  SCRIPT ANSWERS  Relationship to Patient? Third Party  Representative Name?  Wife

## 2022-01-10 DIAGNOSIS — J44.9 CHRONIC OBSTRUCTIVE PULMONARY DISEASE, UNSPECIFIED COPD TYPE (HCC): ICD-10-CM

## 2022-02-24 ENCOUNTER — OFFICE VISIT (OUTPATIENT)
Dept: PRIMARY CARE CLINIC | Age: 72
End: 2022-02-24
Payer: MEDICARE

## 2022-02-24 VITALS
BODY MASS INDEX: 33.8 KG/M2 | OXYGEN SATURATION: 94 % | TEMPERATURE: 97.8 F | HEIGHT: 73 IN | DIASTOLIC BLOOD PRESSURE: 76 MMHG | SYSTOLIC BLOOD PRESSURE: 128 MMHG | WEIGHT: 255 LBS | HEART RATE: 56 BPM

## 2022-02-24 DIAGNOSIS — Z91.199 PERSONAL HISTORY OF NONCOMPLIANCE WITH MEDICAL TREATMENT, PRESENTING HAZARDS TO HEALTH: ICD-10-CM

## 2022-02-24 DIAGNOSIS — I10 ESSENTIAL HYPERTENSION: ICD-10-CM

## 2022-02-24 DIAGNOSIS — I48.11 LONGSTANDING PERSISTENT ATRIAL FIBRILLATION (HCC): Primary | ICD-10-CM

## 2022-02-24 DIAGNOSIS — E78.2 MIXED HYPERLIPIDEMIA: ICD-10-CM

## 2022-02-24 DIAGNOSIS — F32.1 CURRENT MODERATE EPISODE OF MAJOR DEPRESSIVE DISORDER WITHOUT PRIOR EPISODE (HCC): ICD-10-CM

## 2022-02-24 DIAGNOSIS — K59.1 FUNCTIONAL DIARRHEA: ICD-10-CM

## 2022-02-24 DIAGNOSIS — J44.9 CHRONIC OBSTRUCTIVE PULMONARY DISEASE, UNSPECIFIED COPD TYPE (HCC): ICD-10-CM

## 2022-02-24 DIAGNOSIS — E78.1 PURE HYPERTRIGLYCERIDEMIA: ICD-10-CM

## 2022-02-24 DIAGNOSIS — G47.33 OSA (OBSTRUCTIVE SLEEP APNEA): ICD-10-CM

## 2022-02-24 PROCEDURE — 1123F ACP DISCUSS/DSCN MKR DOCD: CPT | Performed by: INTERNAL MEDICINE

## 2022-02-24 PROCEDURE — 4040F PNEUMOC VAC/ADMIN/RCVD: CPT | Performed by: INTERNAL MEDICINE

## 2022-02-24 PROCEDURE — 99213 OFFICE O/P EST LOW 20 MIN: CPT | Performed by: INTERNAL MEDICINE

## 2022-02-24 PROCEDURE — 4004F PT TOBACCO SCREEN RCVD TLK: CPT | Performed by: INTERNAL MEDICINE

## 2022-02-24 PROCEDURE — G8427 DOCREV CUR MEDS BY ELIG CLIN: HCPCS | Performed by: INTERNAL MEDICINE

## 2022-02-24 PROCEDURE — G8484 FLU IMMUNIZE NO ADMIN: HCPCS | Performed by: INTERNAL MEDICINE

## 2022-02-24 PROCEDURE — 3023F SPIROM DOC REV: CPT | Performed by: INTERNAL MEDICINE

## 2022-02-24 PROCEDURE — 3017F COLORECTAL CA SCREEN DOC REV: CPT | Performed by: INTERNAL MEDICINE

## 2022-02-24 PROCEDURE — G8417 CALC BMI ABV UP PARAM F/U: HCPCS | Performed by: INTERNAL MEDICINE

## 2022-02-24 ASSESSMENT — ENCOUNTER SYMPTOMS
CONSTIPATION: 0
WHEEZING: 0
FACIAL SWELLING: 0
SORE THROAT: 0
STRIDOR: 0
NAUSEA: 0
COUGH: 1
COLOR CHANGE: 0
EYE DISCHARGE: 0
TROUBLE SWALLOWING: 0
EYE ITCHING: 0
RHINORRHEA: 0
SHORTNESS OF BREATH: 0
APNEA: 0
DIARRHEA: 1
VOMITING: 0
ANAL BLEEDING: 0
EYE PAIN: 0
BLOOD IN STOOL: 0
ABDOMINAL PAIN: 0
PHOTOPHOBIA: 0

## 2022-02-24 NOTE — ASSESSMENT & PLAN NOTE
watch diet, rosuvastatin per [place].  We'll get most recent lipid profile from the [place]

## 2022-02-24 NOTE — ASSESSMENT & PLAN NOTE
patient refuses to quit smoking even though he knows it will worsen his COPD and increases risk of heart attacks, strokes and lung cancer.  I told him that he will continue to cough as long as he continues to smoke

## 2022-02-24 NOTE — ASSESSMENT & PLAN NOTE
continue using his CPAP as he gets good results with this.  He sleeps better and his restless leg syndrome has improved

## 2022-02-24 NOTE — ASSESSMENT & PLAN NOTE
Blood pressures are stable. Continue medications and monitor blood pressures at home. Call office if systolics are over 699 over diastolics over 90.

## 2022-02-24 NOTE — PROGRESS NOTES
2022    Name: Lydia Liriano : 1950 Sex: male  Age: 67 y.o. Subjective:  Chief Complaint   Patient presents with    Hypertension        Hypertension  Pertinent negatives include no chest pain, headaches, palpitations or shortness of breath. He has a history of chronic atrial fibrillation on Xarelto. He needs to see Dr. Wilder Garcia as he hasn't seen him in over a year. We'll make that referral    He has a history of hypertension, BPH, neuropathy, PTSD and hyperlipidemia. Colonoscopy was done by [facility] on 2021. This did not show any polyps. Previous colonoscopy in 2018 showed tubular adenomas. He did have internal hemorrhoids and moderate diverticulosis. Recommend recheck colonoscopy in 5 years. He also recommended yearly checks for occult blood starting in 2 years. Blood work was done at the [facility] clinic in [facility]. We will get results. Previous blood work in the spring 2020 showed hemoglobin of 11.2 and hematocrit 35.8. He says he has occasional bleeding from his rectum when his hemorrhoids \"act up\". He saw Dr. Darlys Gowers this year for his eye examination    He was sent to the [facility] for evaluation for possible sleep apnea. They did a sleep study on him which showed an AHI of about 80 per wife. She also said that 20% of the study showed \"central apnea\". We will have to get the actual study results from the [facility]. The got him a CPAP machine which he will be fitted for on 2021. Wife talks about his worsening memory problems, he has decreased concentration and is occasionally confused. His sleep studies showed an AHI of four over 80/h. The gave him a CPAP machine and he has been using this regularly. He has an auto PAP. His wife says that he is sleeping better and his restless legs have improved. His chest x-ray did not show any nodules and he had changes consistent with COPD .  He had pulmonary function tests which showed mild obstructive lung disease. We'll get them. We talked about a rescue inhaler when he gets short of breath but refuses to use one. Unfortunately he refuses to quit smoking    He saw Liseth Alston nurse practitioner Dr. Stacey Nath office. . Reviewed his report his PSA had decreased to 6.63. Previous biopsy in April 2021 was negative for malignancy    Another problem is recurrent diarrhea. He did have some spasm on his colonoscopy. His wife says she has been trying to eliminate certain foods from his diet but nothing really works. He occasionally gets cramps with this. Stools sometimes soft and sometimes watery. There is no blood in his stool. He does not want referral to GI for follow-up on this. He says he can control it pretty much with Bentyl and avoiding foods that he knows causes diarrhea    He is refused further immunizations. Specifically refuses his COVID-19 vaccine. I talked to him the risk that he takes for severe infection which may lead to hospitalization or possible death in view of his underlying lung disease and his other comorbidities. Patient still will not get this done. Patient refuses a flu vaccine    Reviewed medications. He did not take his duloxetine and the [place] did start him on rosuvastatin 5 mg along with omega-3 fatty acids. His fasting triglycerides were over 400. They are going to recheck his ,  I will get a copy of those results. Other medications remain the same. He sees his psychologist at the [place] on a regular basis but refuses to take any antidepressants. He takes trazodone to help him  Sleep. He will be seeing his orthopod at the [place] for his right pain. They may consider surgery    Review of Systems   Constitutional: Negative for appetite change, fatigue and unexpected weight change. HENT: Negative for congestion, ear pain, facial swelling, rhinorrhea, sore throat, tinnitus and trouble swallowing. Eyes: Negative for photophobia, pain, discharge, itching and visual disturbance. by mouth daily , Disp: , Rfl:     losartan (COZAAR) 50 MG tablet, Take 50 mg by mouth daily, Disp: , Rfl:     amLODIPine (NORVASC) 10 MG tablet, Take 10 mg by mouth daily Taking 2x day, Disp: , Rfl:     Omega-3 Fatty Acids (FISH OIL) 1000 MG CAPS, Take 2,000 mg by mouth daily, Disp: , Rfl:     Multiple Vitamins-Minerals (MULTI FOR HIM) PACK, Take by mouth 2 times daily, Disp: , Rfl:     rivaroxaban (XARELTO) 20 MG TABS tablet, Take 20 mg by mouth daily, Disp: , Rfl:     Ergocalciferol (VITAMIN D2) 2000 units TABS, Take by mouth daily, Disp: , Rfl:      Allergies   Allergen Reactions    Buchu-Cornsilk-Ch Grass-Hydran Other (See Comments)     delirium    Tricyclic Antidepressants Other (See Comments)     delirium    Furosemide     Gemfibrozil     Hydrochlorothiazide Other (See Comments) and Nausea Only     Other reaction(s): NAUSEA,VOMITING, Dizziness, Weakness present    Morphine     Sertraline      Other reaction(s): Dizziness    Statins     Sulfa Antibiotics     Citalopram Other (See Comments) and Nausea And Vomiting     Other reaction(s): NAUSEA,VOMITING, Dizziness, Weakness present        Past Medical History:   Diagnosis Date    [facility]     Hyperlipidemia     Neuropathy     Osteoarthritis        Health Maintenance Due   Topic Date Due    Hepatitis C screen  Never done    Potassium monitoring  01/13/2021    Creatinine monitoring  01/13/2021    Lipid screen  02/11/2022        Patient Active Problem List   Diagnosis    Atrial fibrillation ([facility]    Essential hypertension    Neuropathy    Osteoarthritis    Hypoxia, sleep related    LIZ (obstructive sleep apnea)    Functional diarrhea    Pure hypertriglyceridemia    Smokes tobacco daily    Personal history of noncompliance with medical treatment, presenting hazards to health    Normal colonoscopy    Current moderate episode of major depressive disorder without prior episode ([facility]    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    Chronic obstructive lung disease (HCC)    Cognitive impairment    Depression    Hyperlipidemia        Past Surgical History:   Procedure Laterality Date    FOOT NEUROMA SURGERY Right     KNEE ARTHROPLASTY Bilateral     SHOULDER SURGERY Right     REMOVAL OF SPUR    TOTAL HIP ARTHROPLASTY      TUMOR EXCISION Right     WARTHINS TUMOR RIGHT NECK        Family History   Problem Relation Age of Onset    Colon Cancer Sister         Social History     Tobacco Use    Smoking status: Heavy Tobacco Smoker     Packs/day: 1.00     Years: 30.00     Pack years: 30.00     Types: Cigarettes     Start date: 1/23/1990    Smokeless tobacco: Never Used   Substance Use Topics    Alcohol use: Never    Drug use: Never        Objective  Vitals:    02/24/22 0842   BP: 128/76   Pulse: 56   Temp: 97.8 °F (36.6 °C)   SpO2: 94%   Weight: 255 lb (115.7 kg)   Height: 6' 1\" (1.854 m)        Exam:  Physical Exam  Vitals reviewed. Exam conducted with a chaperone present. Constitutional:       General: He is not in acute distress. Appearance: He is well-developed. He is obese. Comments: Uses a walker   HENT:      Head: Normocephalic and atraumatic. Right Ear: External ear normal.      Left Ear: External ear normal.      Nose:      Comments: Fleshy nodule left side of tongue near tip     Mouth/Throat:      Mouth: Mucous membranes are moist.   Eyes:      General: No scleral icterus. Right eye: No discharge. Left eye: No discharge. Extraocular Movements: Extraocular movements intact. Conjunctiva/sclera: Conjunctivae normal.   Neck:      Thyroid: No thyromegaly. Cardiovascular:      Rate and Rhythm: Normal rate. Rhythm irregular. Heart sounds: Normal heart sounds. No murmur heard. No friction rub. No gallop. Pulmonary:      Effort: Pulmonary effort is normal. No respiratory distress. Breath sounds: No wheezing or rales.       Comments: Decreased breath sounds with occasional wheeze  Chest:      Chest wall: No tenderness. Abdominal:      General: Bowel sounds are normal. There is no distension. Palpations: Abdomen is soft. There is no mass. Tenderness: There is no abdominal tenderness. There is no guarding or rebound. Musculoskeletal:         General: No tenderness or deformity. Normal range of motion. Cervical back: Normal range of motion and neck supple. Comments: Walks with a cane   Lymphadenopathy:      Cervical: No cervical adenopathy. Skin:     General: Skin is warm and dry. Coloration: Skin is not pale. Findings: No erythema or rash. Neurological:      General: No focal deficit present. Mental Status: He is alert and oriented to person, place, and time. Cranial Nerves: No cranial nerve deficit. Sensory: No sensory deficit. Gait: Gait normal.      Deep Tendon Reflexes: Reflexes normal.   Psychiatric:         Mood and Affect: Mood normal.         Behavior: Behavior normal.         Thought Content: Thought content normal.         Judgment: Judgment normal.          Last labs reviewed. ASSESSMENT & PLAN :   Problem List        Circulatory    Atrial fibrillation ([facility] - Primary       continue Xarelto, refer to Dr. Carito Hooper         Relevant Orders    External Referral To Cardiology    Essential hypertension     Blood pressures are stable. Continue medications and monitor blood pressures at home. Call office if systolics are over 312 over diastolics over 90. Respiratory    LIZ (obstructive sleep apnea)       continue using his CPAP as he gets good results with this. He sleeps better and his restless leg syndrome has improved         Chronic obstructive lung disease ([address]       patient refuses to use any rescue inhalers or to use any steroid inhalers            Digestive    Functional diarrhea       still ongoing.  Patient refuses GI consult         Relevant Medications    dicyclomine (BENTYL) 10 MG capsule       Other Pure hypertriglyceridemia       watch diet, rosuvastatin per [place]. We'll get most recent lipid profile from the [place]         Relevant Medications    metoprolol tartrate (LOPRESSOR) 50 MG tablet    losartan (COZAAR) 50 MG tablet    amLODIPine (NORVASC) 10 MG tablet    rivaroxaban (XARELTO) 20 MG TABS tablet    rosuvastatin (CRESTOR) 5 MG tablet    Personal history of noncompliance with medical treatment, presenting hazards to health       patient refuses to quit smoking even though he knows it will worsen his COPD and increases risk of heart attacks, strokes and lung cancer. I told him that he will continue to cough as long as he continues to smoke         Current moderate episode of major depressive disorder without prior episode [facility])       patient refuses to take SSRIs. He follows regularly with a psychologist at the [place]         Relevant Medications    traZODone (DESYREL) 100 MG tablet    Hyperlipidemia       continue rosuvastatin 5 mg daily         Relevant Medications    metoprolol tartrate (LOPRESSOR) 50 MG tablet    losartan (COZAAR) 50 MG tablet    amLODIPine (NORVASC) 10 MG tablet    rivaroxaban (XARELTO) 20 MG TABS tablet    rosuvastatin (CRESTOR) 5 MG tablet           Return in about 6 months (around 8/24/2022), or copd,.        Ally Ochoa, DO  2/24/2022

## 2022-08-24 ENCOUNTER — OFFICE VISIT (OUTPATIENT)
Dept: PRIMARY CARE CLINIC | Age: 72
End: 2022-08-24
Payer: MEDICARE

## 2022-08-24 ENCOUNTER — TELEPHONE (OUTPATIENT)
Dept: PRIMARY CARE CLINIC | Age: 72
End: 2022-08-24

## 2022-08-24 VITALS
WEIGHT: 264.8 LBS | HEART RATE: 78 BPM | SYSTOLIC BLOOD PRESSURE: 128 MMHG | HEIGHT: 73 IN | DIASTOLIC BLOOD PRESSURE: 76 MMHG | OXYGEN SATURATION: 99 % | TEMPERATURE: 98.1 F | BODY MASS INDEX: 35.09 KG/M2

## 2022-08-24 DIAGNOSIS — J44.9 CHRONIC OBSTRUCTIVE PULMONARY DISEASE, UNSPECIFIED COPD TYPE (HCC): ICD-10-CM

## 2022-08-24 DIAGNOSIS — G47.33 OSA (OBSTRUCTIVE SLEEP APNEA): ICD-10-CM

## 2022-08-24 DIAGNOSIS — F32.89 OTHER DEPRESSION: ICD-10-CM

## 2022-08-24 DIAGNOSIS — I48.11 LONGSTANDING PERSISTENT ATRIAL FIBRILLATION (HCC): Primary | ICD-10-CM

## 2022-08-24 DIAGNOSIS — Z91.199 PERSONAL HISTORY OF NONCOMPLIANCE WITH MEDICAL TREATMENT, PRESENTING HAZARDS TO HEALTH: ICD-10-CM

## 2022-08-24 DIAGNOSIS — R91.8 PULMONARY NODULES: ICD-10-CM

## 2022-08-24 DIAGNOSIS — L98.9 SKIN LESION: ICD-10-CM

## 2022-08-24 DIAGNOSIS — I10 ESSENTIAL HYPERTENSION: ICD-10-CM

## 2022-08-24 DIAGNOSIS — E78.1 PURE HYPERTRIGLYCERIDEMIA: ICD-10-CM

## 2022-08-24 DIAGNOSIS — F17.200 SMOKES TOBACCO DAILY: ICD-10-CM

## 2022-08-24 PROCEDURE — 3023F SPIROM DOC REV: CPT | Performed by: INTERNAL MEDICINE

## 2022-08-24 PROCEDURE — 1123F ACP DISCUSS/DSCN MKR DOCD: CPT | Performed by: INTERNAL MEDICINE

## 2022-08-24 PROCEDURE — 99214 OFFICE O/P EST MOD 30 MIN: CPT | Performed by: INTERNAL MEDICINE

## 2022-08-24 PROCEDURE — G8417 CALC BMI ABV UP PARAM F/U: HCPCS | Performed by: INTERNAL MEDICINE

## 2022-08-24 PROCEDURE — G8427 DOCREV CUR MEDS BY ELIG CLIN: HCPCS | Performed by: INTERNAL MEDICINE

## 2022-08-24 PROCEDURE — 3017F COLORECTAL CA SCREEN DOC REV: CPT | Performed by: INTERNAL MEDICINE

## 2022-08-24 PROCEDURE — 4004F PT TOBACCO SCREEN RCVD TLK: CPT | Performed by: INTERNAL MEDICINE

## 2022-08-24 ASSESSMENT — ENCOUNTER SYMPTOMS
ANAL BLEEDING: 0
SHORTNESS OF BREATH: 1
EYE DISCHARGE: 0
TROUBLE SWALLOWING: 0
WHEEZING: 1
DIARRHEA: 1
EYE PAIN: 0
EYE ITCHING: 0
SORE THROAT: 0
VOMITING: 0
BLOOD IN STOOL: 0
FACIAL SWELLING: 0
STRIDOR: 0
ABDOMINAL PAIN: 0
PHOTOPHOBIA: 0
NAUSEA: 0
RHINORRHEA: 0
COUGH: 0
COLOR CHANGE: 0
CONSTIPATION: 0
APNEA: 0

## 2022-08-24 ASSESSMENT — PATIENT HEALTH QUESTIONNAIRE - PHQ9
4. FEELING TIRED OR HAVING LITTLE ENERGY: 2
1. LITTLE INTEREST OR PLEASURE IN DOING THINGS: 1
SUM OF ALL RESPONSES TO PHQ QUESTIONS 1-9: 8
7. TROUBLE CONCENTRATING ON THINGS, SUCH AS READING THE NEWSPAPER OR WATCHING TELEVISION: 0
SUM OF ALL RESPONSES TO PHQ QUESTIONS 1-9: 8
8. MOVING OR SPEAKING SO SLOWLY THAT OTHER PEOPLE COULD HAVE NOTICED. OR THE OPPOSITE, BEING SO FIGETY OR RESTLESS THAT YOU HAVE BEEN MOVING AROUND A LOT MORE THAN USUAL: 0
3. TROUBLE FALLING OR STAYING ASLEEP: 1
5. POOR APPETITE OR OVEREATING: 0
2. FEELING DOWN, DEPRESSED OR HOPELESS: 3
10. IF YOU CHECKED OFF ANY PROBLEMS, HOW DIFFICULT HAVE THESE PROBLEMS MADE IT FOR YOU TO DO YOUR WORK, TAKE CARE OF THINGS AT HOME, OR GET ALONG WITH OTHER PEOPLE: 0
SUM OF ALL RESPONSES TO PHQ9 QUESTIONS 1 & 2: 4
SUM OF ALL RESPONSES TO PHQ QUESTIONS 1-9: 8
SUM OF ALL RESPONSES TO PHQ QUESTIONS 1-9: 8
9. THOUGHTS THAT YOU WOULD BE BETTER OFF DEAD, OR OF HURTING YOURSELF: 0
6. FEELING BAD ABOUT YOURSELF - OR THAT YOU ARE A FAILURE OR HAVE LET YOURSELF OR YOUR FAMILY DOWN: 1

## 2022-08-24 NOTE — ASSESSMENT & PLAN NOTE
Blood pressures are stable. Continue medications and monitor blood pressures at home. Call office if systolics are over 241 over diastolics over 90.

## 2022-08-24 NOTE — ASSESSMENT & PLAN NOTE
concern for this being malignant. I strongly recommend he see dermatologist either at the [facility] or here in [place] to make sure this is not a melanoma or another type of skin cancer.

## 2022-08-24 NOTE — ASSESSMENT & PLAN NOTE
explained in detail why I wanted him to see the specialists.   If he changes his mind about seeing any of them he is to let me know and I will make referrals to for him  Pulmonary, neurology, dermatology and cardiology

## 2022-08-24 NOTE — ASSESSMENT & PLAN NOTE
watch diet, continue rosuvastatin and omega-3 fish oil.   Obtain results of lipids done by [facility]

## 2022-08-24 NOTE — ASSESSMENT & PLAN NOTE
again recommended that he follow-up with cardiology either in town or at the [facility]. Patient is going to think about this.   Continue Xarelto

## 2022-08-24 NOTE — PROGRESS NOTES
2022    Name: Olinda Weinberg : 1950 Sex: male  Age: 67 y.o. Subjective:  Chief Complaint   Patient presents with    Atrial Fibrillation        Hypertension  Associated symptoms include shortness of breath. Pertinent negatives include no chest pain, headaches or palpitations. He has a history of chronic atrial fibrillation on Xarelto. He needs to see Dr. Iron Miranda as he hasn't seen him in over a year. We'll make that referral  Patient still has not seen Dr. Iron Miranda. I recommended that he see a cardiologist at the [place] system if he is unable to see Dr. Iron Miranda    He has a history of hypertension, BPH, neuropathy, PTSD and hyperlipidemia. Colonoscopy was done by [facility] on 2021. This did not show any polyps. Previous colonoscopy in 2018 showed tubular adenomas. He did have internal hemorrhoids and moderate diverticulosis. Recommend recheck colonoscopy in 5 years. He also recommended yearly checks for occult blood starting in 2 years. Blood work was done at the [place] [facility] in [facility]. We will get results. Previous blood work in the spring 2020 showed hemoglobin of 11.2 and hematocrit 35.8. He says he has occasional bleeding from his rectum when his hemorrhoids \"act up\". He saw Dr. Boy Alejandro this year for his eye examination    He was sent to the [place] for evaluation for possible sleep apnea. They did a sleep study on him which showed an AHI of about 80 per wife. She also said that 20% of the study showed \"central apnea\". We will have to get the actual study results from the [place]. The got him a CPAP machine which he will be fitted for on 2021. He says that when he uses his CPAP he does well but he is having a hard time getting used to the mask and his wife says that when he does not use it he snores, has apneic episodes and restless legs. Wife talks about his worsening memory problems, he has decreased concentration and is occasionally confused. Unfortunately this did not improve with his CPAP use. We discussed sending him to neurology for further evaluation. According to him the [place] did a CT scan of his head and we will get the reports. He does not want to see a neurologist at this time. His sleep studies showed an AHI of four over 80/h. The gave him a CPAP machine and he has been using this regularly. He has an auto PAP. His wife says that he is sleeping better and his restless legs have improved. His chest x-ray did not show any nodules and he had changes consistent with COPD . He had pulmonary function tests which showed mild obstructive lung disease. We'll get them. We talked about a rescue inhaler when he gets short of breath but refuses to use one. Unfortunately he refuses to quit smoking    He had a screening CT scan of his chest at the [place] which showed pulmonary nodules. He is to have a recheck CT scan in 6 months. We will get that report    He saw Zara Garham nurse practitioner Dr. Don Warner office. . Reviewed his report his PSA had decreased to 6.63. Previous biopsy in April 2021 was negative for malignancy    Another problem is recurrent diarrhea. He did have some spasm on his colonoscopy. His wife says she has been trying to eliminate certain foods from his diet but nothing really works. He occasionally gets cramps with this. Stools sometimes soft and sometimes watery. There is no blood in his stool. He does not want referral to GI for follow-up on this. He says he can control it pretty much with Bentyl and avoiding foods that he knows causes diarrhea    He is refused further immunizations. Specifically refuses his COVID-19 vaccine. I talked to him the risk that he takes for severe infection which may lead to hospitalization or possible death in view of his underlying lung disease and his other comorbidities. Patient still will not get this done. Patient refuses a flu vaccine    Reviewed medications.   He did not take his [facility]uloxetine and [facility] 5 mg along with omega-3 fatty acids. His fasting triglycerides were over 400. They are going to recheck his ,  I will get a copy of those results. Other medications remain the same. He sees his psychologist at the [place] on a regular basis but refuses to take any antidepressants. He takes trazodone to help him  Sleep. He has chronic weakness of his legs worsening over the last 2 years. He has been to chiropractors, he is now doing water physical therapy. He is using a walker and they want to try and get him off his walker and walk on his own. [facility]. Review of Systems   Constitutional:  Negative for appetite change, fatigue and unexpected weight change. HENT:  Negative for congestion, ear pain, facial swelling, rhinorrhea, sore throat, tinnitus and trouble swallowing. Eyes:  Negative for photophobia, pain, discharge, itching and visual disturbance. Respiratory:  Positive for shortness of breath and wheezing. Negative for apnea, cough and stridor. 10-15 seconds apneic spells, snoring, leg movement when not using CPAP   Cardiovascular:  Negative for chest pain, palpitations and leg swelling. Gastrointestinal:  Positive for diarrhea. Negative for abdominal pain, anal bleeding, blood in stool, constipation, nausea and vomiting. Endocrine: Negative for cold intolerance, heat intolerance, polydipsia, polyphagia and polyuria. Genitourinary:  Negative for difficulty urinating, dysuria, flank pain, frequency, hematuria and urgency. Musculoskeletal:  Negative for arthralgias, gait problem, joint swelling and myalgias. Right hip pain   Skin:  Negative for color change, pallor and rash. Allergic/Immunologic: Negative for environmental allergies and food allergies. Neurological:  Positive for weakness and numbness. Negative for dizziness, tremors, seizures, syncope, speech difficulty, light-headedness and headaches. Mainly legs   Hematological:  Negative for adenopathy. Does not bruise/bleed easily. Psychiatric/Behavioral:  Positive for confusion. Negative for agitation, behavioral problems, sleep disturbance and suicidal ideas. The patient is not nervous/anxious.          Problems with memory        Current Outpatient Medications:     Cholecalciferol 50 MCG (2000 UT) TABS, Take 2,000 Units by mouth daily, Disp: , Rfl:     Homeopathic Products (ZICAM COLD REMEDY PO), Take by mouth, Disp: , Rfl:     melatonin 3 MG TABS tablet, Take 3 mg by mouth daily, Disp: , Rfl:     rosuvastatin (CRESTOR) 5 MG tablet, TAKE ONE-HALF TABLET BY MOUTH EVERY DAY, Disp: , Rfl:     traZODone (DESYREL) 100 MG tablet, Take 200 mg by mouth nightly, Disp: , Rfl:     metoprolol tartrate (LOPRESSOR) 50 MG tablet, Take 50 mg by mouth daily , Disp: , Rfl:     losartan (COZAAR) 50 MG tablet, Take 50 mg by mouth daily, Disp: , Rfl:     amLODIPine (NORVASC) 10 MG tablet, Take 10 mg by mouth daily Taking 2x day, Disp: , Rfl:     Omega-3 Fatty Acids (FISH OIL) 1000 MG CAPS, Take 2,000 mg by mouth daily, Disp: , Rfl:     Multiple Vitamins-Minerals (MULTI FOR HIM) PACK, Take by mouth 2 times daily, Disp: , Rfl:     rivaroxaban (XARELTO) 20 MG TABS tablet, Take 20 mg by mouth daily, Disp: , Rfl:      Allergies   Allergen Reactions    Buchu-Cornsilk-Ch Grass-Hydran Other (See Comments)     delirium    Tricyclic Antidepressants Other (See Comments)     delirium    Furosemide     Gemfibrozil     Hydrochlorothiazide Other (See Comments) and Nausea Only     Other reaction(s): NAUSEA,VOMITING, Dizziness, Weakness present    Morphine     Sertraline      Other reaction(s): Dizziness    Statins     Sulfa Antibiotics     Citalopram Other (See Comments) and Nausea And Vomiting     Other reaction(s): NAUSEA,VOMITING, Dizziness, Weakness present        Past Medical History:   Diagnosis Date    Atrial fibrillation (HCC)     Hyperlipidemia     Neuropathy Osteoarthritis        Health Maintenance Due   Topic Date Due    Lipids  02/11/2022        Patient Active Problem List   Diagnosis    Atrial fibrillation ([facility]    Essential hypertension    Neuropathy    Osteoarthritis    Hypoxia, sleep related    LIZ (obstructive sleep apnea)    Functional diarrhea    Pure hypertriglyceridemia    Smokes tobacco daily    Personal history of noncompliance with medical treatment, presenting hazards to health    Normal colonoscopy    Current moderate episode of major depressive disorder without prior episode ([facility]    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    Chronic obstructive lung disease (HCC)    Cognitive impairment    Depression    Hyperlipidemia    Pulmonary nodules    Skin lesion        Past Surgical History:   Procedure Laterality Date    FOOT NEUROMA SURGERY Right     KNEE ARTHROPLASTY Bilateral     SHOULDER SURGERY Right     REMOVAL OF SPUR    TOTAL HIP ARTHROPLASTY      TUMOR EXCISION Right     WARTHINS TUMOR RIGHT NECK        Family History   Problem Relation Age of Onset    Colon Cancer Sister         Social History     Tobacco Use    Smoking status: Heavy Smoker     Packs/day: 1.00     Years: 30.00     Pack years: 30.00     Types: Cigarettes     Start date: 1/23/1990    Smokeless tobacco: Never   Substance Use Topics    Alcohol use: Never    Drug use: Never        Objective  Vitals:    08/24/22 0955   BP: 128/76   Pulse: 78   Temp: 98.1 °F (36.7 °C)   TempSrc: Temporal   SpO2: 99%   Weight: 264 lb 12.8 oz (120.1 kg)   Height: 6' 1\" (1.854 m)        Exam:  Physical Exam  Vitals reviewed. Exam conducted with a chaperone present. Constitutional:       General: He is not in acute distress. Appearance: He is well-developed. He is obese. Comments: Uses a walker   HENT:      Head: Normocephalic and atraumatic.       Right Ear: External ear normal.      Left Ear: External ear normal.      Nose:      Comments: Fleshy nodule left side of tongue near tip  Eyes:      General: No scleral icterus. Right eye: No discharge. Left eye: No discharge. Conjunctiva/sclera: Conjunctivae normal.   Neck:      Thyroid: No thyromegaly. Cardiovascular:      Rate and Rhythm: Normal rate. Rhythm irregular. Heart sounds: Normal heart sounds. No murmur heard. No friction rub. No gallop. Pulmonary:      Effort: Pulmonary effort is normal. No respiratory distress. Breath sounds: No wheezing or rales. Comments: Decreased breath sounds with occasional wheeze  Chest:      Chest wall: No tenderness. Abdominal:      General: Bowel sounds are normal. There is no distension. Palpations: Abdomen is soft. There is no mass. Tenderness: There is no abdominal tenderness. There is no guarding or rebound. Musculoskeletal:         General: No tenderness or deformity. Normal range of motion. Cervical back: Normal range of motion and neck supple. Lymphadenopathy:      Cervical: No cervical adenopathy. Skin:     General: Skin is warm and dry. Coloration: Skin is not pale. Findings: No erythema or rash. Comments: Hyperpigmented lesion on his anterior chest which  has irregular borders   Neurological:      General: No focal deficit present. Mental Status: He is alert and oriented to person, place, and time. Cranial Nerves: No cranial nerve deficit. Sensory: No sensory deficit. Gait: Gait normal.      Deep Tendon Reflexes: Reflexes normal.   Psychiatric:         Mood and Affect: Mood normal.         Behavior: Behavior normal.         Thought Content: Thought content normal.         Judgment: Judgment normal.        Last labs reviewed. ASSESSMENT & PLAN :   Problem List          Circulatory    Atrial fibrillation ([facility] - Primary       again recommended that he follow-up with cardiology either in town or at the [facility]. Patient is going to think about this.   Continue Xarelto         Relevant Medications    metoprolol tartrate (LOPRESSOR) 50 MG tablet    losartan (COZAAR) 50 MG tablet    amLODIPine (NORVASC) 10 MG tablet    rivaroxaban (XARELTO) 20 MG TABS tablet    rosuvastatin (CRESTOR) 5 MG tablet    Essential hypertension     Blood pressures are stable. Continue medications and monitor blood pressures at home. Call office if systolics are over 336 over diastolics over 90. Respiratory    LIZ (obstructive sleep apnea)       use his CPAP is much as possible. Check with his [facility] doctor about getting a new mask         Chronic obstructive lung disease ([address]       patient refuses to use a rescue inhaler. Patient refuses steroid inhaler. Patient refuses to see a pulmonologist            Musculoskeletal and Integument    Skin lesion       concern for this being malignant. I strongly recommend he see dermatologist either at the [facility] system or here in [facility] to make sure this is not a melanoma or another type of skin cancer. Other    Pulmonary nodules       obtain repeat port from [facility] and follow-up CT of his lungs in 6 months         Pure hypertriglyceridemia       watch diet, continue rosuvastatin and omega-3 fish oil. Obtain results of lipids done by [facility]         Relevant Medications    metoprolol tartrate (LOPRESSOR) 50 MG tablet    losartan (COZAAR) 50 MG tablet    amLODIPine (NORVASC) 10 MG tablet    rivaroxaban (XARELTO) 20 MG TABS tablet    rosuvastatin (CRESTOR) 5 MG tablet    Smokes tobacco daily       patient absolutely refuses to quit smoking         Personal history of noncompliance with medical treatment, presenting hazards to health       explained in detail why I wanted him to see the specialists.   If he changes his mind about seeing any of them he is to let me know and I will make referrals to for him  Pulmonary, neurology, dermatology and cardiology         Depression    Relevant Medications    traZODone (DESYREL) 100 MG tablet      We will obtain his records from the [facility] to include most recent laboratory tests, imaging studies and any other procedures done    30 minutes was spent with the patient reviewing history and problems. Reviewing reasons why referrals to specialist were needed. Reviewing consequences of his lifestyle decisions. Return in about 6 months (around 2/24/2023), or COPD,.        Erin Agrawal, DO  8/24/2022

## 2022-08-24 NOTE — ASSESSMENT & PLAN NOTE
use his CPAP is much as possible.   Check with his [facility] doctor about getting a new mask

## 2022-08-26 NOTE — TELEPHONE ENCOUNTER
Spoke with wife and she said they will stop in to sign form when he comes in to town for therapy on Monday

## 2022-11-08 ENCOUNTER — HOSPITAL ENCOUNTER (OUTPATIENT)
Dept: MRI IMAGING | Age: 72
Discharge: HOME | End: 2022-11-10
Payer: MEDICARE

## 2022-11-08 DIAGNOSIS — R51.9 NONINTRACTABLE HEADACHE, UNSPECIFIED CHRONICITY PATTERN, UNSPECIFIED HEADACHE TYPE: ICD-10-CM

## 2022-11-08 PROCEDURE — 6360000004 HC RX CONTRAST MEDICATION: Performed by: RADIOLOGY

## 2022-11-08 PROCEDURE — A9579 GAD-BASE MR CONTRAST NOS,1ML: HCPCS | Performed by: RADIOLOGY

## 2022-11-08 PROCEDURE — 70553 MRI BRAIN STEM W/O & W/DYE: CPT

## 2022-11-08 RX ADMIN — GADOTERIDOL 20 ML: 279.3 INJECTION, SOLUTION INTRAVENOUS at 17:24

## 2023-01-18 ENCOUNTER — OFFICE VISIT (OUTPATIENT)
Dept: FAMILY MEDICINE CLINIC | Age: 73
End: 2023-01-18
Payer: MEDICARE

## 2023-01-18 VITALS
SYSTOLIC BLOOD PRESSURE: 124 MMHG | HEIGHT: 73 IN | HEART RATE: 68 BPM | RESPIRATION RATE: 22 BRPM | TEMPERATURE: 97.2 F | BODY MASS INDEX: 34.99 KG/M2 | WEIGHT: 264 LBS | OXYGEN SATURATION: 96 % | DIASTOLIC BLOOD PRESSURE: 70 MMHG

## 2023-01-18 DIAGNOSIS — J06.9 UPPER RESPIRATORY TRACT INFECTION, UNSPECIFIED TYPE: Primary | ICD-10-CM

## 2023-01-18 PROCEDURE — 3017F COLORECTAL CA SCREEN DOC REV: CPT | Performed by: PHYSICIAN ASSISTANT

## 2023-01-18 PROCEDURE — 1123F ACP DISCUSS/DSCN MKR DOCD: CPT | Performed by: PHYSICIAN ASSISTANT

## 2023-01-18 PROCEDURE — 3074F SYST BP LT 130 MM HG: CPT | Performed by: PHYSICIAN ASSISTANT

## 2023-01-18 PROCEDURE — G8484 FLU IMMUNIZE NO ADMIN: HCPCS | Performed by: PHYSICIAN ASSISTANT

## 2023-01-18 PROCEDURE — G8417 CALC BMI ABV UP PARAM F/U: HCPCS | Performed by: PHYSICIAN ASSISTANT

## 2023-01-18 PROCEDURE — 99203 OFFICE O/P NEW LOW 30 MIN: CPT | Performed by: PHYSICIAN ASSISTANT

## 2023-01-18 PROCEDURE — 4004F PT TOBACCO SCREEN RCVD TLK: CPT | Performed by: PHYSICIAN ASSISTANT

## 2023-01-18 PROCEDURE — G8427 DOCREV CUR MEDS BY ELIG CLIN: HCPCS | Performed by: PHYSICIAN ASSISTANT

## 2023-01-18 PROCEDURE — 3078F DIAST BP <80 MM HG: CPT | Performed by: PHYSICIAN ASSISTANT

## 2023-01-18 RX ORDER — BENZONATATE 100 MG/1
100-200 CAPSULE ORAL 3 TIMES DAILY PRN
Qty: 42 CAPSULE | Refills: 0 | Status: SHIPPED | OUTPATIENT
Start: 2023-01-18 | End: 2023-01-25

## 2023-01-18 RX ORDER — PREDNISONE 20 MG/1
40 TABLET ORAL DAILY
Qty: 10 TABLET | Refills: 0 | Status: SHIPPED | OUTPATIENT
Start: 2023-01-18 | End: 2023-01-23

## 2023-01-18 RX ORDER — DOXYCYCLINE HYCLATE 100 MG
100 TABLET ORAL 2 TIMES DAILY
Qty: 20 TABLET | Refills: 0 | Status: SHIPPED | OUTPATIENT
Start: 2023-01-18 | End: 2023-01-28

## 2023-01-18 ASSESSMENT — ENCOUNTER SYMPTOMS
BACK PAIN: 0
ABDOMINAL PAIN: 0
SORE THROAT: 0
SHORTNESS OF BREATH: 0
PHOTOPHOBIA: 0
DIARRHEA: 0
VOMITING: 0
COUGH: 1
NAUSEA: 0

## 2023-01-18 NOTE — PROGRESS NOTES
23  [facility] : 1950 Sex: male  Age 67 y.o. Subjective:  Chief Complaint   Patient presents with    Cough         20-year-old male with a history of atrial fibrillation, hypertension, sleep apnea, COPD and hyperlipidemia presents to the walk-in clinic with his wife for evaluation of sinus congestion and cough for the last 2 weeks. Wife states at first it started with a runny nose. She has been trying multiple over-the-counter sinus medicine, Mucinex and Sudafed without significant relief. They have been trying to figure out the cause of his recent headaches and he had an MRI done in 2022. She states the coughing is causing the headaches to occur more frequently but states it feels like the headaches he has been getting for the past several months. No lateralizing weakness. No lightheadedness, dizziness or syncope. No slurring of speech or facial drooping. They denies shortness of breath or chest pain. Patient does have a history of COPD. He is a current smoker. Wife states that he has clear sputum with cough but sometimes it is tinged white to yellow. No fever, chills, nausea or vomiting. He is unable to use his CPAP at night due to his nasal congestion. Patient's wife has also tried Thomas-Synephrine. They deny any red flag symptoms for his headaches. Review of Systems   Constitutional:  Positive for fatigue. Negative for chills and fever. HENT:  Positive for congestion. Negative for ear pain and sore throat. Eyes:  Negative for photophobia and visual disturbance. Respiratory:  Positive for cough. Negative for shortness of breath. Cardiovascular:  Negative for chest pain. Gastrointestinal:  Negative for abdominal pain, diarrhea, nausea and vomiting. Genitourinary:  Negative for difficulty urinating, dysuria, frequency and urgency. Musculoskeletal:  Negative for back pain, neck pain and neck stiffness. Skin:  Negative for rash.    Neurological: Positive for headaches. Negative for dizziness, syncope, weakness and light-headedness. Hematological:  Negative for adenopathy. Does not bruise/bleed easily. Psychiatric/Behavioral:  Negative for agitation and confusion. All other systems reviewed and are negative.       PMH:     Past Medical History:   Diagnosis Date    Atrial fibrillation ([facility]     Hyperlipidemia     Neuropathy     Osteoarthritis        Past Surgical History:   Procedure Laterality Date    FOOT NEUROMA SURGERY Right     KNEE ARTHROPLASTY Bilateral     SHOULDER SURGERY Right     REMOVAL OF SPUR    TOTAL HIP ARTHROPLASTY      TUMOR EXCISION Right     WARTHINS TUMOR RIGHT NECK       Family History   Problem Relation Age of Onset    Colon Cancer Sister        Medications:     Current Outpatient Medications:     predniSONE (DELTASONE) 20 MG tablet, Take 2 tablets by mouth daily for 5 days, Disp: 10 tablet, Rfl: 0    doxycycline hyclate (VIBRA-TABS) 100 MG tablet, Take 1 tablet by mouth 2 times daily for 10 days, Disp: 20 tablet, Rfl: 0    benzonatate (TESSALON) 100 MG capsule, Take 1-2 capsules by mouth 3 times daily as needed for Cough, Disp: 42 capsule, Rfl: 0    Cholecalciferol 50 MCG (2000 UT) TABS, Take 2,000 Units by mouth daily, Disp: , Rfl:     Homeopathic Products (ZICAM COLD REMEDY PO), Take by mouth, Disp: , Rfl:     melatonin 3 MG TABS tablet, Take 3 mg by mouth daily, Disp: , Rfl:     rosuvastatin (CRESTOR) 5 MG tablet, TAKE ONE-HALF TABLET BY MOUTH EVERY DAY, Disp: , Rfl:     traZODone (DESYREL) 100 MG tablet, Take 200 mg by mouth nightly, Disp: , Rfl:     metoprolol tartrate (LOPRESSOR) 50 MG tablet, Take 50 mg by mouth daily , Disp: , Rfl:     losartan (COZAAR) 50 MG tablet, Take 50 mg by mouth daily, Disp: , Rfl:     amLODIPine (NORVASC) 10 MG tablet, Take 10 mg by mouth daily Taking 2x day, Disp: , Rfl:     Omega-3 Fatty Acids (FISH OIL) 1000 MG CAPS, Take 2,000 mg by mouth daily, Disp: , Rfl:     Multiple Vitamins-Minerals (MULTI FOR HIM) PACK, Take by mouth 2 times daily, Disp: , Rfl:     rivaroxaban (XARELTO) 20 MG TABS tablet, Take 20 mg by mouth daily, Disp: , Rfl:     Allergies: Allergies   Allergen Reactions    Buchu-Cornsilk-Ch Grass-Hydran Other (See Comments)     delirium    Tricyclic Antidepressants Other (See Comments)     delirium    Furosemide     Gemfibrozil     Hydrochlorothiazide Other (See Comments) and Nausea Only     Other reaction(s): NAUSEA,VOMITING, Dizziness, Weakness present    Morphine     Sertraline      Other reaction(s): Dizziness    Statins     Sulfa Antibiotics     Citalopram Other (See Comments) and Nausea And Vomiting     Other reaction(s): NAUSEA,VOMITING, Dizziness, Weakness present       Social History:     Social History     Tobacco Use    Smoking status: Heavy Smoker     Packs/day: 1.00     Years: 30.00     Pack years: 30.00     Types: Cigarettes     Start date: 1/23/1990    Smokeless tobacco: Never   Substance Use Topics    Alcohol use: Never    Drug use: Never       Patient lives at home. Physical Exam:     Vitals:    01/18/23 1417   BP: 124/70   Pulse: 68   Resp: 22   Temp: 97.2 °F (36.2 °C)   TempSrc: Temporal   SpO2: 96%   Weight: 264 lb (119.7 kg)   Height: 6' 1\" (1.854 m)       Exam:  Physical Exam  Vitals and nursing note reviewed. Constitutional:       General: He is not in acute distress. Appearance: He is well-developed. HENT:      Head: Normocephalic and atraumatic. Right Ear: Tympanic membrane normal.      Left Ear: Tympanic membrane normal.      Nose: Nose normal.      Mouth/Throat:      Mouth: Mucous membranes are moist.      Pharynx: Oropharynx is clear. Eyes:      Conjunctiva/sclera: Conjunctivae normal.      Pupils: Pupils are equal, round, and reactive to light. Cardiovascular:      Rate and Rhythm: Normal rate and regular rhythm. Pulmonary:      Effort: Pulmonary effort is normal. No respiratory distress.       Comments: Decreased breath sounds bilaterally but clear. Abdominal:      General: Bowel sounds are normal.      Palpations: Abdomen is soft. Tenderness: There is no abdominal tenderness. Musculoskeletal:         General: Normal range of motion. Cervical back: Normal range of motion. No rigidity. Lymphadenopathy:      Cervical: No cervical adenopathy. Skin:     General: Skin is warm and dry. Neurological:      General: No focal deficit present. Mental Status: He is alert and oriented to person, place, and time. Mental status is at baseline. Psychiatric:         Mood and Affect: Mood normal.         Behavior: Behavior normal.         Thought Content: Thought content normal.         Judgment: Judgment normal.         Testing:           Medical Decision Making:       Patient upon arrival did not appear toxic or lethargic. Vital signs were reviewed. Past medical history reviewed. Allergies reviewed. Medications reviewed. Patient is presenting with the above complaint of cough and congestion. Differential diagnosis was discussed with the patient. Patient will be given a prescription for prednisone, doxycycline and Tessalon Perles. Patient may use over-the-counter analgesics and decongestants as needed. Patient is continue to monitor symptoms and follow-up with their primary care provider in 3-5 days if no improvement. Patient will return or go to the emergency department for any worsening symptoms. Patient understands the plan is agreeable. Clinical Impression:   Travis George was seen today for cough. Diagnoses and all orders for this visit:    Upper respiratory tract infection, unspecified type    Other orders  -     predniSONE (DELTASONE) 20 MG tablet; Take 2 tablets by mouth daily for 5 days  -     doxycycline hyclate (VIBRA-TABS) 100 MG tablet; Take 1 tablet by mouth 2 times daily for 10 days  -     benzonatate (TESSALON) 100 MG capsule;  Take 1-2 capsules by mouth 3 times daily as needed for Cough      The patient is to call for any concerns or return if any of the signs or symptoms worsen. The patient is to follow-up with PCP in the next 2-3 days for repeat evaluation repeat assessment or go directly to the emergency department. SIGNATURE: Radha Fagan PA-C

## 2023-02-15 ENCOUNTER — OFFICE VISIT (OUTPATIENT)
Dept: PRIMARY CARE CLINIC | Age: 73
End: 2023-02-15
Payer: MEDICARE

## 2023-02-15 VITALS
TEMPERATURE: 97.2 F | BODY MASS INDEX: 35.78 KG/M2 | WEIGHT: 270 LBS | HEIGHT: 73 IN | HEART RATE: 95 BPM | SYSTOLIC BLOOD PRESSURE: 120 MMHG | DIASTOLIC BLOOD PRESSURE: 68 MMHG | OXYGEN SATURATION: 94 %

## 2023-02-15 DIAGNOSIS — Z72.3 INACTIVITY: ICD-10-CM

## 2023-02-15 DIAGNOSIS — Z87.891 PERSONAL HISTORY OF TOBACCO USE, PRESENTING HAZARDS TO HEALTH: ICD-10-CM

## 2023-02-15 DIAGNOSIS — E78.2 MIXED HYPERLIPIDEMIA: ICD-10-CM

## 2023-02-15 DIAGNOSIS — I48.11 LONGSTANDING PERSISTENT ATRIAL FIBRILLATION (HCC): ICD-10-CM

## 2023-02-15 DIAGNOSIS — E66.01 SEVERE OBESITY (BMI 35.0-39.9) WITH COMORBIDITY (HCC): ICD-10-CM

## 2023-02-15 DIAGNOSIS — M17.0 PRIMARY OSTEOARTHRITIS OF BOTH KNEES: Primary | ICD-10-CM

## 2023-02-15 DIAGNOSIS — Z00.00 MEDICARE ANNUAL WELLNESS VISIT, SUBSEQUENT: Primary | ICD-10-CM

## 2023-02-15 DIAGNOSIS — I10 ESSENTIAL HYPERTENSION: ICD-10-CM

## 2023-02-15 DIAGNOSIS — J44.9 CHRONIC OBSTRUCTIVE PULMONARY DISEASE, UNSPECIFIED COPD TYPE (HCC): ICD-10-CM

## 2023-02-15 DIAGNOSIS — G44.229 CHRONIC TENSION-TYPE HEADACHE, NOT INTRACTABLE: ICD-10-CM

## 2023-02-15 DIAGNOSIS — F32.89 OTHER DEPRESSION: ICD-10-CM

## 2023-02-15 PROCEDURE — G8484 FLU IMMUNIZE NO ADMIN: HCPCS | Performed by: INTERNAL MEDICINE

## 2023-02-15 PROCEDURE — 1123F ACP DISCUSS/DSCN MKR DOCD: CPT | Performed by: INTERNAL MEDICINE

## 2023-02-15 PROCEDURE — G0439 PPPS, SUBSEQ VISIT: HCPCS | Performed by: INTERNAL MEDICINE

## 2023-02-15 PROCEDURE — 3017F COLORECTAL CA SCREEN DOC REV: CPT | Performed by: INTERNAL MEDICINE

## 2023-02-15 PROCEDURE — 99406 BEHAV CHNG SMOKING 3-10 MIN: CPT | Performed by: INTERNAL MEDICINE

## 2023-02-15 RX ORDER — CYCLOBENZAPRINE HCL 10 MG
10 TABLET ORAL
COMMUNITY
Start: 2023-01-31

## 2023-02-15 RX ORDER — TRIAMCINOLONE ACETONIDE 1 MG/G
CREAM TOPICAL
COMMUNITY
Start: 2022-05-20

## 2023-02-15 RX ORDER — IBUPROFEN 600 MG/1
TABLET ORAL
COMMUNITY
Start: 2023-02-03

## 2023-02-15 SDOH — ECONOMIC STABILITY: INCOME INSECURITY: HOW HARD IS IT FOR YOU TO PAY FOR THE VERY BASICS LIKE FOOD, HOUSING, MEDICAL CARE, AND HEATING?: NOT HARD AT ALL

## 2023-02-15 SDOH — ECONOMIC STABILITY: FOOD INSECURITY: WITHIN THE PAST 12 MONTHS, THE FOOD YOU BOUGHT JUST DIDN'T LAST AND YOU DIDN'T HAVE MONEY TO GET MORE.: NEVER TRUE

## 2023-02-15 SDOH — ECONOMIC STABILITY: HOUSING INSECURITY
IN THE LAST 12 MONTHS, WAS THERE A TIME WHEN YOU DID NOT HAVE A STEADY PLACE TO SLEEP OR SLEPT IN A SHELTER (INCLUDING NOW)?: NO

## 2023-02-15 SDOH — ECONOMIC STABILITY: FOOD INSECURITY: WITHIN THE PAST 12 MONTHS, YOU WORRIED THAT YOUR FOOD WOULD RUN OUT BEFORE YOU GOT MONEY TO BUY MORE.: NEVER TRUE

## 2023-02-15 ASSESSMENT — ENCOUNTER SYMPTOMS
CONSTIPATION: 0
DIARRHEA: 1
ANAL BLEEDING: 0
FACIAL SWELLING: 0
RHINORRHEA: 0
TROUBLE SWALLOWING: 0
SORE THROAT: 0
BLOOD IN STOOL: 0
STRIDOR: 0
COLOR CHANGE: 0
NAUSEA: 0
SHORTNESS OF BREATH: 1
VOMITING: 0
EYE DISCHARGE: 0
APNEA: 0
PHOTOPHOBIA: 0
EYE ITCHING: 0
EYE PAIN: 0
ABDOMINAL PAIN: 0
WHEEZING: 1
COUGH: 0

## 2023-02-15 ASSESSMENT — PATIENT HEALTH QUESTIONNAIRE - PHQ9
SUM OF ALL RESPONSES TO PHQ QUESTIONS 1-9: 4
SUM OF ALL RESPONSES TO PHQ QUESTIONS 1-9: 4
9. THOUGHTS THAT YOU WOULD BE BETTER OFF DEAD, OR OF HURTING YOURSELF: 0
2. FEELING DOWN, DEPRESSED OR HOPELESS: 2
SUM OF ALL RESPONSES TO PHQ QUESTIONS 1-9: 4
5. POOR APPETITE OR OVEREATING: 0
6. FEELING BAD ABOUT YOURSELF - OR THAT YOU ARE A FAILURE OR HAVE LET YOURSELF OR YOUR FAMILY DOWN: 0
10. IF YOU CHECKED OFF ANY PROBLEMS, HOW DIFFICULT HAVE THESE PROBLEMS MADE IT FOR YOU TO DO YOUR WORK, TAKE CARE OF THINGS AT HOME, OR GET ALONG WITH OTHER PEOPLE: 0
SUM OF ALL RESPONSES TO PHQ QUESTIONS 1-9: 4
7. TROUBLE CONCENTRATING ON THINGS, SUCH AS READING THE NEWSPAPER OR WATCHING TELEVISION: 0
8. MOVING OR SPEAKING SO SLOWLY THAT OTHER PEOPLE COULD HAVE NOTICED. OR THE OPPOSITE, BEING SO FIGETY OR RESTLESS THAT YOU HAVE BEEN MOVING AROUND A LOT MORE THAN USUAL: 0
3. TROUBLE FALLING OR STAYING ASLEEP: 1
1. LITTLE INTEREST OR PLEASURE IN DOING THINGS: 0
SUM OF ALL RESPONSES TO PHQ9 QUESTIONS 1 & 2: 2
4. FEELING TIRED OR HAVING LITTLE ENERGY: 1

## 2023-02-15 ASSESSMENT — LIFESTYLE VARIABLES: HOW OFTEN DO YOU HAVE A DRINK CONTAINING ALCOHOL: NEVER

## 2023-02-15 NOTE — ASSESSMENT & PLAN NOTE
obtain lipid profile done by [place] recently.   Continue rosuvastatin 5 mg a day and watch his diet

## 2023-02-15 NOTE — ASSESSMENT & PLAN NOTE
at goal.  Monitor blood pressures and continue losartan 50 mg daily, metoprolol tartrate 50 mg daily and amlodipine 10 mg daily

## 2023-02-15 NOTE — PROGRESS NOTES
Medicare Annual Wellness Visit    Diego Marie is here for Medicare AWV    Assessment & Plan    Recommendations for Preventive Services Due: see orders and patient instructions/AVS.  Recommended screening schedule for the next 5-10 years is provided to the patient in written form: see Patient Instructions/AVS.     No follow-ups on file. Subjective       Patient's complete Health Risk Assessment and screening values have been reviewed and are found in Flowsheets. Positive Risk Factor Screenings with Interventions:               General HRA Questions:  Select all that apply: (!) Anger    Anger Interventions:  Patient declined any further interventions or treatment       Weight and Activity:  Physical Activity: Inactive    Days of Exercise per Week: 0 days    Minutes of Exercise per Session: 0 min     On average, how many days per week do you engage in moderate to strenuous exercise (like a brisk walk)?: 0 days  Have you lost any weight without trying in the past 3 months?: No         Inactivity Interventions:  Patient comments: hard to walk because of balance issues, Tries to walk with cane or walker  Obesity Interventions:  See AVS for additional education material              ADL's:   Patient reports needing help with:  Select all that apply: (!) Bathing    Interventions:  Patient comments: wife helps with his bathing     Tobacco Use:  Tobacco Use: High Risk    Smoking Tobacco Use: Heavy Smoker    Smokeless Tobacco Use: Never    Passive Exposure: Not on file     E-cigarette/Vaping       Questions Responses    E-cigarette/Vaping Use     Start Date     Passive Exposure     Quit Date     Counseling Given     Comments           Interventions:  Patient declined any further intervention or treatment refuses to quit smoking    Tobacco Use Counseling: Patient was counseled on tobacco cessation.  Based upon patient's motivation to change his behavior, the following plan was agreed upon: Patient is not ready to work toward tobacco cessation at this time. Educational materials regarding tobacco cessation were provided. Provider spent 5 minutes counseling patient. Objective   There were no vitals filed for this visit. There is no height or weight on file to calculate BMI. Allergies   Allergen Reactions    Buchu-Cornsilk-Ch Grass-Hydran Other (See Comments)     delirium    Tricyclic Antidepressants Other (See Comments)     delirium    Furosemide     Gemfibrozil     Hydrochlorothiazide Other (See Comments) and Nausea Only     Other reaction(s): NAUSEA,VOMITING, Dizziness, Weakness present    Morphine     Sertraline      Other reaction(s): Dizziness    Statins     Sulfa Antibiotics     Citalopram Other (See Comments) and Nausea And Vomiting     Other reaction(s): NAUSEA,VOMITING, Dizziness, Weakness present     Prior to Visit Medications    Medication Sig Taking?  Authorizing Provider   triamcinolone (KENALOG) 0.1 % cream APPLY A SMALL AMOUNT EXTERNALLY TWICE A DAY TO BACK Yes Historical Provider, MD   ibuprofen (ADVIL;MOTRIN) 600 MG tablet take 1 tablet by mouth every 8 hours if needed for pain Yes Historical Provider, MD   diclofenac sodium (VOLTAREN) 1 % GEL APPLY 2GM AS MEASURED ON DOSING CARD EXTERNALLY TWICE A DAY TO BOTH WRISTS (GENTLY MASSAGE INTO SKIN) *FLAMMABLE: KEEP AWAY FROM HEAT AND FLAMES* Yes Historical Provider, MD   cyclobenzaprine (FLEXERIL) 10 MG tablet 10 mg Yes Historical Provider, MD   Cholecalciferol 50 MCG (2000 UT) TABS Take 2,000 Units by mouth daily Yes Historical Provider, MD   rosuvastatin (CRESTOR) 5 MG tablet TAKE ONE-HALF TABLET BY MOUTH EVERY DAY Yes Historical Provider, MD   traZODone (DESYREL) 100 MG tablet Take 200 mg by mouth nightly Yes Historical Provider, MD   metoprolol tartrate (LOPRESSOR) 50 MG tablet Take 50 mg by mouth daily  Yes Historical Provider, MD   losartan (COZAAR) 50 MG tablet Take 50 mg by mouth daily Yes Historical Provider, MD   amLODIPine (NORVASC) 10 MG tablet Take 10 mg by mouth daily Taking 2x day Yes Historical Provider, MD   Omega-3 Fatty Acids (FISH OIL) 1000 MG CAPS Take 2,000 mg by mouth daily Yes Historical Provider, MD   Multiple Vitamins-Minerals (MULTI FOR HIM) PACK Take by mouth 2 times daily Yes Historical Provider, MD   rivaroxaban (XARELTO) 20 MG TABS tablet Take 20 mg by mouth daily Yes Historical Provider, MD       CareTeam (Including outside providers/suppliers regularly involved in providing care):   Patient Care Team:  Devonte Jefferson DO as PCP - General (Internal Medicine)  Devonte Jefferson DO as PCP - Empaneled Provider     Reviewed and updated this visit:  Meds       Has living will and [facility] and will bring in copies       Nima Turk DO

## 2023-02-15 NOTE — ASSESSMENT & PLAN NOTE
discussed the benefits of weight loss with patient and his wife.  It would definitely help his sleep problems, hypertension and hyperlipidemia.  Also might help his arthritis problems if he has less weight.  Patient is aware of this and plans to try and lose some weight

## 2023-02-15 NOTE — ASSESSMENT & PLAN NOTE
chronic, persistent, on Xarelto. Ventricular response is moderate. Patient refuses to follow-up with cardiology.

## 2023-02-15 NOTE — PROGRESS NOTES
2/15/2023    Name: Clement Palacio : 1950 Sex: male  Age: 73 y.o.   Subjective:  Chief Complaint   Patient presents with    COPD      [facility] [place].  He was sent by his [facility] Dr. Galloway to neurologist Dr. Espino at [facility].  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  He was then sent to Dr. Palmer and the ultrasound showed that the mass was too deep to biopsy.  They will  keep an eye on it.    He was evaluated for his chronic daily headaches by neurologist.  He felt that these were more tension type headaches from muscle spasms from the back of his neck.  He was prescribed cyclobenzaprine which he has used on a as needed basis and he says his headaches have markedly improved.    He is also scheduled [facility] some type [facility] \"[facility]\" [facility].  I think it is for his pulmonary nodules.  We will get a copy of that once is done.    He has a history of COPD [facility].  He is not on any medications for this.  He continues to smoke daily and refuses to quit smoking.  He is well aware of the risk that he runs for heart attacks, strokes, COPD and other medical conditions with his smoking.  He is getting a little more short of breath especially with exertion.  I asked him about pulmonary referral or maybe even using an inhaler on a as needed basis but he refuses to do either.  Says that if he gets really short of breath he will contact either Dr. Galloway or myself for rescue inhaler.  He has not had a pulmonary function test in a while.      He has a history of chronic atrial fibrillation on Xarelto. He needs to see Dr. Chang as he hasn't seen him in over a year.  He refuses to make an appointment to see cardiologist    He has a history of hypertension, BPH, neuropathy, PTSD and hyperlipidemia.      Colonoscopy was done by  on 2021.  This did not show any polyps.  Previous colonoscopy in 2018 showed tubular adenomas. He did have internal hemorrhoids and moderate diverticulosis. Recommend recheck colonoscopy in 5 years. He also recommended yearly checks for occult blood starting in 2 years. Blood work was done at the [place] [facility] in [facility]. We will get results. Previous blood work in the spring 2020 showed hemoglobin of 11.2 and hematocrit 35.8. He says he has occasional bleeding from his rectum when his hemorrhoids \"act up\". He saw Dr. Clement Haley this year for his eye examination    He was diagnosed with obstructive sleep apnea and prescribed CPAP apparatus. He tries to use it every night but a lot of times he can only keep it on for several minutes and he throws it across the room. Some nights he can keep it on for several hours. Wife talks about his worsening memory problems, he has decreased concentration and is occasionally confused. Unfortunately this did not improve with his CPAP use. He sees a psych colleges or psychiatrist at the [place] on a regular basis. She prescribes trazodone for insomnia. His chest x-ray did not show any nodules and he had changes consistent with COPD . Old pulmonary function tests  showed mild obstructive lung disease. He had a screening CT scan of his chest at the [place] which showed pulmonary nodules. He is to have a recheck CT scan in the near future at the [place]    He saw [facility] nurse practitioner Dr. Toshia Servin office. . Reviewed his report his PSA had decreased to 6.63. Previous biopsy in April 2021 was negative for malignancy    Another problem is recurrent diarrhea. He did have some spasm on his colonoscopy. His wife says she has been trying to eliminate certain foods from his diet but nothing really works. He occasionally gets cramps with this. Stools sometimes soft and sometimes watery. There is no blood in his stool. He does not want referral to GI for follow-up on this.   He says he can control it pretty much with Bentyl and avoiding foods that he knows causes diarrhea    He is refused further immunizations. Reviewed medications. He did not take his duloxetine and the [place] did start him on rosuvastatin 5 mg along with omega-3 fatty acids. His fasting triglycerides were over 400. They are going to recheck his ,  I will get a copy of those results. Other medications remain the same. Boo Renteria He has chronic weakness of his legs worsening over the last 2 years. He has been to chiropractors, he is now doing water physical therapy. He is using a walker and they want to try and get him off his walker and walk on his own. [facility] is looking [facility] this. Review of Systems   Constitutional:  Negative for appetite change, fatigue and unexpected weight change. HENT:  Negative for congestion, ear pain, facial swelling, rhinorrhea, sore throat, tinnitus and trouble swallowing. Eyes:  Negative for photophobia, pain, discharge, itching and visual disturbance. Respiratory:  Positive for shortness of breath and wheezing. Negative for apnea, cough and stridor. 10-15 seconds apneic spells, snoring, leg movement when not using CPAP   Cardiovascular:  Negative for chest pain, palpitations and leg swelling. Gastrointestinal:  Positive for diarrhea. Negative for abdominal pain, anal bleeding, blood in stool, constipation, nausea and vomiting. Endocrine: Negative for cold intolerance, heat intolerance, polydipsia, polyphagia and polyuria. Genitourinary:  Negative for difficulty urinating, dysuria, flank pain, frequency, hematuria and urgency. Musculoskeletal:  Negative for arthralgias, gait problem, joint swelling and myalgias. Right hip pain   Skin:  Negative for color change, pallor and rash. Allergic/Immunologic: Negative for environmental allergies and food allergies. Neurological:  Positive for weakness and numbness.  Negative for dizziness, tremors, seizures, syncope, speech difficulty, light-headedness and headaches. Mainly legs   Hematological:  Negative for adenopathy. Does not bruise/bleed easily. Psychiatric/Behavioral:  Positive for confusion. Negative for agitation, behavioral problems, sleep disturbance and suicidal ideas. The patient is not nervous/anxious.          Problems with memory        Current Outpatient Medications:     triamcinolone (KENALOG) 0.1 % cream, APPLY A SMALL AMOUNT EXTERNALLY TWICE A DAY TO BACK, Disp: , Rfl:     ibuprofen (ADVIL;MOTRIN) 600 MG tablet, take 1 tablet by mouth every 8 hours if needed for pain, Disp: , Rfl:     diclofenac sodium (VOLTAREN) 1 % GEL, APPLY 2GM AS MEASURED ON DOSING CARD EXTERNALLY TWICE A DAY TO BOTH WRISTS (GENTLY MASSAGE INTO SKIN) *FLAMMABLE: KEEP AWAY FROM HEAT AND FLAMES*, Disp: , Rfl:     cyclobenzaprine (FLEXERIL) 10 MG tablet, 10 mg, Disp: , Rfl:     Handicap Placard MISC, by Does not apply route, Disp: 1 each, Rfl: 0    Cholecalciferol 50 MCG (2000 UT) TABS, Take 2,000 Units by mouth daily, Disp: , Rfl:     rosuvastatin (CRESTOR) 5 MG tablet, TAKE ONE-HALF TABLET BY MOUTH EVERY DAY, Disp: , Rfl:     traZODone (DESYREL) 100 MG tablet, Take 200 mg by mouth nightly, Disp: , Rfl:     metoprolol tartrate (LOPRESSOR) 50 MG tablet, Take 50 mg by mouth daily , Disp: , Rfl:     losartan (COZAAR) 50 MG tablet, Take 50 mg by mouth daily, Disp: , Rfl:     amLODIPine (NORVASC) 10 MG tablet, Take 10 mg by mouth daily Taking 2x day, Disp: , Rfl:     Omega-3 Fatty Acids (FISH OIL) 1000 MG CAPS, Take 2,000 mg by mouth daily, Disp: , Rfl:     Multiple Vitamins-Minerals (MULTI FOR HIM) PACK, Take by mouth 2 times daily, Disp: , Rfl:     rivaroxaban (XARELTO) 20 MG TABS tablet, Take 20 mg by mouth daily, Disp: , Rfl:      Allergies   Allergen Reactions    Buchu-Cornsilk-Ch Grass-Hydran Other (See Comments)     delirium    Tricyclic Antidepressants Other (See Comments)     delirium    Furosemide     Gemfibrozil     Hydrochlorothiazide Other (See Comments) and Nausea Only     Other reaction(s): NAUSEA,VOMITING, Dizziness, Weakness present    Morphine     Sertraline      Other reaction(s): Dizziness    Statins     Sulfa Antibiotics     Citalopram Other (See Comments) and Nausea And Vomiting     Other reaction(s): NAUSEA,VOMITING, Dizziness, Weakness present        Past Medical History:   Diagnosis Date    Atrial fibrillation ([facility]     Hyperlipidemia     Neuropathy     Osteoarthritis        Health Maintenance Due   Topic Date Due    Lipids  02/11/2022        Patient Active Problem List   Diagnosis    At[facility] fibrillation ([facility]    Essential hypertension    Neuropathy    Osteoarthritis    Hypoxia, sleep related    LIZ (obstructive sleep apnea)    Functional diarrhea    Pure hypertriglyceridemia    Smokes tobacco daily    Personal history of noncompliance with medical treatment, presenting hazards to health    Normal colonoscopy    Current moderate episode of major depressive disorder without prior episode ([facility]    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    Chronic obstructive lung disease ([facility]    Cognitive impairment    Depression    Hyperlipidemia    Pulmonary nodules    Skin lesion    Chronic tension-type headache, not intractable    Severe obesity (BMI 35.0-39. 9) with comorbidity ([facility]        Past Surgical History:   Procedure Laterality Date    FOOT NEUROMA SURGERY Right     KNEE ARTHROPLASTY Bilateral     SHOULDER SURGERY Right     REMOVAL OF SPUR    TOTAL HIP ARTHROPLASTY      TUMOR EXCISION Right     WARTHINS TUMOR RIGHT NECK        Family History   Problem Relation Age of Onset    Colon Cancer Sister         Social History     Tobacco Use    Smoking status: Heavy Smoker     Packs/day: 1.00     Years: 30.00     Pack years: 30.00     Types: Cigarettes     Start date: 1/23/1990    Smokeless tobacco: Never   Substance Use Topics    Alcohol use: Never    Drug use: Never        Objective  Vitals:    02/15/23 1301   BP: 120/68   Pulse: 95   Temp: 97.2 °F (36.2 °C)   SpO2: 94% Weight: 270 lb (122.5 kg)   Height: 6' 1\" (1.854 m)        Exam:  Physical Exam  Vitals reviewed. Exam conducted with a chaperone present. Constitutional:       General: He is not in acute distress. Appearance: He is well-developed. He is obese. Comments: Uses a walker   HENT:      Head: Normocephalic and atraumatic. Right Ear: External ear normal.      Left Ear: External ear normal.      Nose:      Comments: Fleshy nodule left side of tongue near tip  Eyes:      General: No scleral icterus. Right eye: No discharge. Left eye: No discharge. Conjunctiva/sclera: Conjunctivae normal.   Neck:      Thyroid: No thyromegaly. Cardiovascular:      Rate and Rhythm: Normal rate. Rhythm irregular. Heart sounds: Normal heart sounds. No murmur heard. No friction rub. No gallop. Pulmonary:      Effort: Pulmonary effort is normal. No respiratory distress. Breath sounds: No wheezing or rales. Comments: Decreased breath sounds with occasional wheeze  Chest:      Chest wall: No tenderness. Abdominal:      General: Bowel sounds are normal. There is no distension. Palpations: Abdomen is soft. There is no mass. Tenderness: There is no abdominal tenderness. There is no guarding or rebound. Musculoskeletal:         General: No tenderness or deformity. Normal range of motion. Cervical back: Normal range of motion and neck supple. Lymphadenopathy:      Cervical: No cervical adenopathy. Skin:     General: Skin is warm and dry. Coloration: Skin is not pale. Findings: No erythema or rash. Comments: Hyperpigmented lesion on his anterior chest which  has irregular borders   Neurological:      General: No focal deficit present. Mental Status: He is alert and oriented to person, place, and time. Cranial Nerves: No cranial nerve deficit. Sensory: No sensory deficit.       Gait: Gait normal.      Deep Tendon Reflexes: Reflexes normal. Psychiatric:         Mood and Affect: Mood normal.         Behavior: Behavior normal.         Thought Content: Thought content normal.         Judgment: Judgment normal.        Last labs reviewed. ASSESSMENT & PLAN :   Problem List          Circulatory    Atrial fibrillation (HCC)       chronic, persistent, on Xarelto. Ventricular response is moderate. Patient refuses to follow-up with cardiology. Relevant Medications    metoprolol tartrate (LOPRESSOR) 50 MG tablet    losartan (COZAAR) 50 MG tablet    amLODIPine (NORVASC) 10 MG tablet    rivaroxaban (XARELTO) 20 MG TABS tablet    rosuvastatin (CRESTOR) 5 MG tablet    Essential hypertension       at goal.  Monitor blood pressures and continue losartan 50 mg daily, metoprolol tartrate 50 mg daily and amlodipine 10 mg daily            Respiratory    Chronic obstructive lung disease ([facility]       patient does not want to see pulmonologist, does not want to get pulmonary function tests. He says he does not need a rescue inhaler right now but will let us know if he does need 1. He requests handicap placard because he gets  short of breath with exertion            Nervous and Auditory    Chronic tension-type headache, not intractable       stable with cyclobenzaprine as needed. Follow-up with neurologist         Relevant Medications    metoprolol tartrate (LOPRESSOR) 50 MG tablet    amLODIPine (NORVASC) 10 MG tablet    traZODone (DESYREL) 100 MG tablet    ibuprofen (ADVIL;MOTRIN) 600 MG tablet    cyclobenzaprine (FLEXERIL) 10 MG tablet       Musculoskeletal and Integument    Osteoarthritis - Primary       bilateral knee osteoarthritis. He uses diclofenac and ibuprofen to Control the pain. He uses either a walker or a cane to help him ambulate. Handicap placard given         Relevant Medications    ibuprofen (ADVIL;MOTRIN) 600 MG tablet    cyclobenzaprine (FLEXERIL) 10 MG tablet    Handicap Placard MISC       Other    Severe obesity (BMI 35.0-39. 9) with comorbidity [facility])       discussed the benefits of weight loss with patient and his wife. It would definitely help his sleep problems, hypertension and hyperlipidemia. Also might help his arthritis problems if he has less weight. Patient is aware of this and plans to try and lose some weight         Depression       patient refuses to take other antidepressants. He remains on his trazodone which he uses to help him sleep at night. He sees his psychiatrist on a regular basis         Relevant Medications    traZODone (DESYREL) 100 MG tablet    Hyperlipidemia       obtain lipid profile done by [facility] recently. Continue rosuvastatin 5 mg a day and watch his diet         Relevant Medications    metoprolol tartrate (LOPRESSOR) 50 MG tablet    losartan (COZAAR) 50 MG tablet    amLODIPine (NORVASC) 10 MG tablet    rivaroxaban (XARELTO) 20 MG TABS tablet    rosuvastatin (CRESTOR) 5 MG tablet      We will obtain his records from the [facility] to include most recent laboratory tests, imaging studies and any other procedures done    30 minutes was spent with the patient reviewing history and problems. Reviewing reasons why referrals to specialist were needed. Reviewing consequences of his lifestyle decisions. Return in about 6 months (around 8/15/2023), or COPD, HTN, HL.        Poonam Carrera, DO  2/15/2023

## 2023-02-15 NOTE — ASSESSMENT & PLAN NOTE
patient does not want to see pulmonologist, does not want to get pulmonary function tests. He says he does not need a rescue inhaler right now but will let us know if he does need 1.   He requests handicap placard because he gets  short of breath with exertion

## 2023-02-15 NOTE — ASSESSMENT & PLAN NOTE
bilateral knee osteoarthritis. He uses diclofenac and ibuprofen to Control the pain. He uses either a walker or a cane to help him ambulate.   Handicap placard given

## 2023-02-15 NOTE — ASSESSMENT & PLAN NOTE
patient refuses to take other antidepressants. He remains on his trazodone which he uses to help him sleep at night.   He sees his psychiatrist on a regular basis

## 2023-02-15 NOTE — PATIENT INSTRUCTIONS
Learning About Managing Anger  What causes anger? Many things can cause anger: Stress at work or at home. Social situations that make you angry. A response to everyday events. Anger signals your body to prepare for a fight. This reaction is often called \"fight or flight. \" When you get angry, adrenaline and other hormones are released into your blood. Then your blood pressure goes up, your heart beats faster, and you breathe faster. When you express anger in a healthy way, it can inspire change and make you productive. But if you don't have the skills to express anger in a healthy way, anger can build up. You may hurt others--and yourself--emotionally and even physically. Violent behavior often starts with verbal threats or fairly minor incidents. But over time, it can involve physical harm. It can include physical, verbal, or sexual abuse of an intimate partner (domestic violence), a child (child abuse), or an older adult (elder abuse). It can also make you sick. Anger and constant hostility keep your blood pressure high. They increase your chances of having another health problem, such as depression, a heart attack, or a stroke. Some people with post-traumatic stress disorder (PTSD) feel angry and on alert all the time. It may feel like there are no other ways to react when you are angry. But when you learn to work with anger in appropriate and healthy ways, your anger no longer controls you. How can you manage your anger? The first step to managing anger is to be more aware of it. Note the thoughts, feelings, and emotions that you have when you get angry. Practice noticing these signs of anger when you are calm. If you are more aware of the signs of anger, you can take steps to manage it. Here are a few tips: Think before you act. Take time to stop and cool down when you feel yourself getting angry. Count to 10 while you take slow, steady breaths. Practice some other form of mental relaxation.   Learn the feelings that lead to angry outbursts. Anger and hostility may be a symptom of unhappy feelings or depression about your job, your relationship, or other aspects of your personal life. Avoid situations that lead to angry outbursts. If standing in line bothers you, do errands at less busy times. Express anger in a healthy way. You might:  Go for a short walk or jog. Draw, paint, or listen to music to release the anger. Write in a daily journal.  Use \"I\" statements, not \"you\" statements, to discuss your anger. Say \"I don't feel valued when my needs are not being met\" instead of \"You make me mad when you are so inconsiderate. \"  Take care of yourself. Exercise regularly. Eat a variety of healthy foods. Don't skip meals. Try to get 8 hours of sleep each night. Limit your use of alcohol, and don't use drugs. Practice yoga, meditation, or liseth chi to relax. Explore other resources that may be available through your job or your community. Contact your human resources department at work. You might be able to get services through an employee assistance program.  Contact your local hospital, mental health facility, or health department. Ask what types of programs or support groups are available in your area. Do not keep guns in your home. If you must have guns in your home, unload them and lock them up. Lock ammunition in a separate place. Keep guns away from children. Where can you find help? If anger or stress starts to harm your work or personal relationships, you might seek help. You can learn ways to manage your feelings and actions. Talk to someone you trust, or find a counselor. There are groups in your area that can connect you with people to talk to. Behavioral Health Treatment Services . This service from the national Substance Abuse and [address] can help you find local counselors. Search online at [employer] samhsa.gov or call 1-750-830-HELP (8260), or LoudcasterBASHIR 5-856-282-335-313-0360. Parents Anonymous. Self-help groups that serve parents under stress, as well as children who have been abused, are available throughout the [place], [place] ([facility]), and [facility]. To find a group in your area, search online or in your phone book under Parents Anonymous or call (390) 158-3482. Where can you learn more? Go to http://www.carrizales.com/ and enter Z357 to learn more about \"Learning About Managing Anger. \"  Current as of: February 9, 2022               Content Version: 13.5  © 2017-6541 [employer]. Care instructions adapted under license by [facility] ([facility]). If you have questions about a medical condition or this instruction, always ask your healthcare professional. [address] any warranty or liability for your use of this information. Learning About Being Active as an Older Adult  Why is being active important as you get older? Being active is one of the best things you can do for your health. And it's never too late to start. Being active--or getting active, if you aren't already--has definite benefits. It can:  Give you more energy,  Keep your mind sharp. Improve balance to reduce your risk of falls. Help you manage chronic illness with fewer medicines. No matter how old you are, how fit you are, or what health problems you have, there is a form of activity that will work for you. And the more physical activity you can do, the better your overall health will be. What kinds of activity can help you stay healthy? Being more active will make your daily activities easier. Physical activity includes planned exercise and things you do in daily life. There are four types of activity:  Aerobic. Doing aerobic activity makes your heart and lungs strong. Includes walking, dancing, and gardening. Aim for at least 2½ hours spread throughout the week. It improves your energy and can help you sleep better. Muscle-strengthening.   This type of activity can help maintain muscle and strengthen bones. Includes climbing stairs, using resistance bands, and lifting or carrying heavy loads. Aim for at least twice a week. It can help protect the knees and other joints. Stretching. Stretching gives you better range of motion in joints and muscles. Includes upper arm stretches, calf stretches, and gentle yoga. Aim for at least twice a week, preferably after your muscles are warmed up from other activities. It can help you function better in daily life. Balancing. This helps you stay coordinated and have good posture. Includes heel-to-toe walking, liseth chi, and certain types of yoga. Aim for at least 3 days a week. It can reduce your risk of falling. Even if you have a hard time meeting the recommendations, it's better to be more active than less active. All activity done in each category counts toward your weekly total. You'd be surprised how daily things like carrying groceries, keeping up with grandchildren, and taking the stairs can add up. What keeps you from being active? If you've had a hard time being more active, you're not alone. Maybe you remember being able to do more. Or maybe you've never thought of yourself as being active. It's frustrating when you can't do the things you want. Being more active can help. What's holding you back? Getting started. Have a goal, but break it into easy tasks. Small steps build into big accomplishments. Staying motivated. If you feel like skipping your activity, remember your goal. Maybe you want to move better and stay independent. Every activity gets you one step closer. Not feeling your best.  Start with 5 minutes of an activity you enjoy. Prove to yourself you can do it. As you get comfortable, increase your time. You may not be where you want to be. But you're in the process of getting there. Everyone starts somewhere. How can you find safe ways to stay active?   Talk with your doctor about any physical challenges you're facing. Make a plan with your doctor if you have a health problem or aren't sure how to get started with activity. If you're already active, ask your doctor if there is anything you should change to stay safe as your body and health change. If you tend to feel dizzy after you take medicine, avoid activity at that time. Try being active before you take your medicine. This will reduce your risk of falls. If you plan to be active at home, make sure to clear your space before you get started. Remove things like TV cords, coffee tables, and throw rugs. It's safest to have plenty of space to move freely. The key to getting more active is to take it slow and steady. Try to improve only a little bit at a time. Pick just one area to improve on at first. And if an activity hurts, stop and talk to your doctor. Where can you learn more? Go to http://www.carrizales.com/ and enter P600 to learn more about \"Learning About Being Active as an Older Adult. \"  Current as of: October 10, 2022               Content Version: 13.5  © 7770-9931 [employer]. Care instructions adapted under license by [facility] ([facility]). If you have questions about a medical condition or this instruction, always ask your healthcare professional. [address] any warranty or liability for your use of this information. Learning About Activities of Daily Living  What are activities of daily living? Activities of daily living (ADLs) are the basic self-care tasks you do every day. As you age, and if you have health problems, you may find that it's harder to do these things for yourself. That's when you may need some help. Your doctor uses ADLs to measure how much help you need. Knowing what you can and can't do for yourself is an important first step to getting help. And when you have the help you need, you can stay as independent as possible.   Your doctor will want to know if you are able to do tasks such as: Take a bath or shower without help. Go to the bathroom by yourself. Dress and undress without help. Shave, comb your hair, and brush teeth on your own. Get in and out of bed or a chair without help. Feed yourself without help. If you are having trouble doing basic self-care tasks, talk with your doctor. You may want to bring a caregiver or family member who can help the doctor understand your needs and abilities. How will a doctor assess your ADLs? Asking about ADLs is part of a routine health checkup your doctor will likely do as you age. Your health check might be done in a doctor's office, in your home, or at a hospital. The goal is to find out if you are having any problems that could make your health problems worse or that make it unsafe for you to be on your own. To measure your ADLs, your doctor will ask how hard it is for you to do routine tasks. He or she may also want to know if you have changed the way you do a task because of a health problem. He or she may watch how you:  Walk back and forth. Keep your balance while you stand or walk. Move from sitting to standing or from a bed to a chair. Button or unbutton a shirt or sweater. Remove and put on your shoes. It's normal to feel a little worried or anxious if you find you can't do all the things you used to be able to do. Talking with your doctor about ADLs isn't a test that you either pass or fail. It's just a way to get more information about your health and safety. Follow-up care is a key part of your treatment and safety. Be sure to make and go to all appointments, and call your doctor if you are having problems. It's also a good idea to know your test results and keep a list of the medicines you take. Current as of: October 6, 2021               Content Version: 13.5  © 7957-6453 [employer]. Care instructions adapted under license by [facility] ([facility]).  If you have questions about a medical condition or this instruction, always ask your healthcare professional. [address] any warranty or liability for your use of this information. Advance Directives: Care Instructions  Overview  An advance directive is a legal way to state your wishes at the end of your life. It tells your family and your doctor what to do if you can't say what you want. There are two main types of advance directives. You can change them any time your wishes change. Living will. This form tells your family and your doctor your wishes about life support and other treatment. The form is also called a declaration. Medical power of . This form lets you name a person to make treatment decisions for you when you can't speak for yourself. This person is called a health care agent (health care proxy, health care surrogate). The form is also called a durable power of  for health care. If you do not have an advance directive, decisions about your medical care may be made by a family member, or by a doctor or a  who doesn't know you. It may help to think of an advance directive as a gift to the people who care for you. If you have one, they won't have to make tough decisions by themselves. For more information, including forms for your state, see the [address] website (www.caringinfo.org/planning/advance-directives/). Follow-up care is a key part of your treatment and safety. Be sure to make and go to all appointments, and call your doctor if you are having problems. It's also a good idea to know your test results and keep a list of the medicines you take. What should you include in an advance directive? Many states have a unique advance directive form. (It may ask you to address specific issues.) Or you might use a universal form that's approved by many states.   If your form doesn't tell you what to address, it may be hard to know what to include in your advance directive. Use the questions below to help you get started. Who do you want to make decisions about your medical care if you are not able to? What life-support measures do you want if you have a serious illness that gets worse over time or can't be cured? What are you most afraid of that might happen? (Maybe you're afraid of having pain, losing your independence, or being kept alive by machines.)  Where would you prefer to die? (Your home? A hospital? A nursing home?)  Do you want to donate your organs when you die? Do you want certain Protestant practices performed before you die? When should you call for help? Be sure to contact your doctor if you have any questions. Where can you learn more? Go to http://www.carrizales.com/ and enter R264 to learn more about \"Advance Directives: Care Instructions. \"  Current as of: June 16, 2022               Content Version: 13.5  © 2006-2022 [employer]. Care instructions adapted under license by [facility] ([facility]). If you have questions about a medical condition or this instruction, always ask your healthcare professional. [address] any warranty or liability for your use of this information. Starting a Weight Loss Plan: Care Instructions  Overview     If you're thinking about losing weight, it can be hard to know where to start. Your doctor can help you set up a weight loss plan that best meets your needs. You may want to take a class on nutrition or exercise, or you could join a weight loss support group. If you have questions about how to make changes to your eating or exercise habits, ask your doctor about seeing a registered dietitian or an exercise specialist.  It can be a big challenge to lose weight. But you don't have to make huge changes at once. Make small changes, and stick with them. When those changes become habit, add a few more changes.   If you don't think you're ready to make changes right now, try to pick a date in the future. Make an appointment to see your doctor to discuss whether the time is right for you to start a plan. Follow-up care is a key part of your treatment and safety. Be sure to make and go to all appointments, and call your doctor if you are having problems. It's also a good idea to know your test results and keep a list of the medicines you take. How can you care for yourself at home? Set realistic goals. Many people expect to lose much more weight than is likely. A weight loss of 5% to 10% of your body weight may be enough to improve your health. Get family and friends involved to provide support. Talk to them about why you are trying to lose weight, and ask them to help. They can help by participating in exercise and having meals with you, even if they may be eating something different. Find what works best for you. If you do not have time or do not like to cook, a program that offers meal replacement bars or shakes may be better for you. Or if you like to prepare meals, finding a plan that includes daily menus and recipes may be best.  Ask your doctor about other health professionals who can help you achieve your weight loss goals. A dietitian can help you make healthy changes in your diet. An exercise specialist or  can help you develop a safe and effective exercise program.  A counselor or psychiatrist can help you cope with issues such as depression, anxiety, or family problems that can make it hard to focus on weight loss. Consider joining a support group for people who are trying to lose weight. Your doctor can suggest groups in your area. Where can you learn more? Go to http://www.woods.com/ and enter U357 to learn more about \"Starting a Weight Loss Plan: Care Instructions. \"  Current as of: August 25, 2022               Content Version: 13.5  © 0242-4428 Healthwise, Incorporated. Care instructions adapted under license by [facility] ([facility]).  If you have questions about a medical condition or this instruction, always ask your healthcare professional. [address] any warranty or liability for your use of this information. Learning About Healthy Weight  What is a healthy weight? A healthy weight is the weight at which you feel good about yourself and have energy for work and play. It's also one that lowers your risk for health problems. What can you do to stay at a healthy weight? It can be hard to stay at a healthy weight, especially when fast food, vending-machine snacks, and processed foods are so easy to find. And with your busy lifestyle, activity may be low on your list of things to do. But staying at a healthy weight may be easier than you think. Here are some dos and don'ts for staying at a healthy weight. Do eat healthy foods  The kinds of foods you eat have a big impact on both your weight and your health. Reaching and staying at a healthy weight is not about going on a diet. It's about making healthier food choices every day and changing your diet for good. Healthy eating means eating a variety of foods so that you get all the nutrients you need. Your body needs protein, carbohydrate, and fats for energy. They keep your heart beating, your brain active, and your muscles working. On most days, try to eat from each food group. This means eating a variety of: Whole grains, such as whole wheat breads and pastas. Fruits and vegetables. Dairy products, such as low-fat milk, yogurt, and cheese. Lean proteins, such as all types of fish, chicken without the skin, and beans. Don't have too much or too little of one thing. All foods, if eaten in moderation, can be part of healthy eating. Even sweets can be okay. If your favorite foods are high in fat, salt, sugar, or calories, limit how often you eat them. Eat smaller servings, or look for healthy substitutes.   Do watch what you eat  Many people eat more than their bodies need. Part of staying at a healthy weight means learning how much food you really need from day to day and not eating more than that. Even with healthy foods, eating too much can make you gain weight. Having a well-balanced diet means that you eat enough, but not too much, and that your food gives you the nutrients you need to stay healthy. So listen to your body. Eat when you're hungry. Stop when you feel satisfied. It's a good idea to have healthy snacks ready for when you get hungry. Keep healthy snacks with you at work, in your car, and at home. If you have a healthy snack easily available, you'll be less likely to pick a candy bar or bag of chips from a vending machine instead. Some healthy snacks you might want to keep on hand are fruit, low-fat yogurt, string cheese, low-fat microwave popcorn, raisins and other dried fruit, nuts, whole wheat crackers, pretzels, carrots, celery sticks, and broccoli. Do some physical activity  A big part of reaching and staying at a healthy weight is being active. When you're active, you burn calories. This makes it easier to reach and stay at a healthy weight. When you're active on a regular basis, your body burns more calories, even when you're at rest. Being active helps you lose fat and build lean muscle. Try to be active for at least 1 hour every day. This may sound like a lot, but it's okay to be active in smaller blocks of time that add up to 1 hour a day. Any activity that makes your heart beat faster and keeps it there for a while counts. A brisk walk, run, or swim will get your heart beating faster. So will climbing stairs, shooting baskets, or cycling. Even some household chores like vacuuming and mowing the lawn will get your heart rate up. Pick activities that you enjoy--ones that make your heart beat faster, your muscles stronger, and your muscles and joints more flexible. If you find more than one thing you like doing, do them all.  You don't have to do the same thing every day.  Don't diet  Diets don't work.  Diets are temporary. Because you give up so much when you diet, you may be hungry and think about food all the time. And after you stop dieting, you also may overeat to make up for what you missed. Most people who diet end up gaining back the pounds they lost--and more.  Remember that healthy bodies come in lots of shapes and sizes. Everyone can get healthier by eating better and being more active.  Where can you learn more?  Go to https://www.AdviceIQ.net/patientEd and enter B909 to learn more about \"Learning About Healthy Weight.\"  Current as of: August 25, 2022               Content Version: 13.5  © 2006-2022 [employer].   Care instructions adapted under license by [place]. If you have questions about a medical condition or this instruction, always ask your healthcare professional. [employer] disclaims any warranty or liability for your use of this information.           Learning About Benefits From Quitting Smoking  Why is it important to quit smoking?     If you're thinking about quitting smoking, you may have a few reasons to be smoke-free. Your health may be one of them.  When you quit smoking, you lower your risk for many serious health problems, such as cancer, lung disease, heart attack, stroke, blood vessel disease, and blindness from macular degeneration.  When you're smoke-free, you get sick less often, and you heal faster. You are less likely to get colds, flu, bronchitis, and pneumonia.  As a nonsmoker, you may find that your mood is better and you are less stressed.  When and how will you feel healthier?  Quitting has real health benefits that start from day 1 of being smoke-free. And the longer you stay smoke-free, the healthier you get and the better you feel.  The first hours or days  Soon after you stop smoking, your blood pressure and heart rate go down. That means there's less stress on your heart  and blood vessels. Within days, the level of carbon monoxide in your blood drops back to normal. That makes room for more oxygen. Within weeks or months  When your lungs begin to clear, you cough less and breathe deeper, so it may be easier to be active. Your sense of taste and smell should return. That means you may enjoy food more than you have since you started smoking. Over the years  Over the years, your risks of heart disease, heart attack, and stroke are lower. After 10 years, your risk of lung cancer is cut by about half. And your risk for many other types of cancer is lower too. How would quitting help others in your life? When you quit smoking, you improve the health of everyone who now breathes in your smoke. Their heart, lung, and cancer risks may drop, much like yours. They are sick less. For babies and small children, living smoke-free means they're less likely to have ear infections, pneumonia, and bronchitis. If you are or will be pregnant someday, quitting smoking means a healthier . Children who are close to you are less likely to become adult smokers. Where can you learn more? Go to http://SmartStart.woods.com/ and enter O319 to learn more about \"Learning About Benefits From Quitting Smoking. \"  Current as of: 2021               Content Version: 13.5   Healthwise, Incorporated. Care instructions adapted under license by [facility] ([facility]). If you have questions about a medical condition or this instruction, always ask your healthcare professional. [place] [address] any warranty or liability for your use of this information. A Healthy Heart: Care Instructions  Your Care Instructions     Coronary artery disease, also called heart disease, occurs when a substance called plaque builds up in the vessels that supply oxygen-rich blood to your heart muscle. This can narrow the blood vessels and reduce blood flow.  A heart attack happens when blood flow is completely blocked. A high-fat diet, smoking, and other factors increase the risk of heart disease. Your doctor has found that you have a chance of having heart disease. You can do lots of things to keep your heart healthy. It may not be easy, but you can change your diet, exercise more, and quit smoking. These steps really work to lower your chance of heart disease. Follow-up care is a key part of your treatment and safety. Be sure to make and go to all appointments, and call your doctor if you are having problems. It's also a good idea to know your test results and keep a list of the medicines you take. How can you care for yourself at home? Diet    Use less salt when you cook and eat. This helps lower your blood pressure. Taste food before salting. Add only a little salt when you think you need it. With time, your taste buds will adjust to less salt.     Eat fewer snack items, fast foods, canned soups, and other high-salt, high-fat, processed foods.     Read food labels and try to avoid saturated and trans fats. They increase your risk of heart disease by raising cholesterol levels.     Limit the amount of solid fat-butter, margarine, and shortening-you eat. Use olive, peanut, or canola oil when you cook. Bake, broil, and steam foods instead of frying them.     Eat a variety of fruit and vegetables every day. Dark green, deep orange, red, or yellow fruits and vegetables are especially good for you. Examples include spinach, carrots, peaches, and berries.     Foods high in fiber can reduce your cholesterol and provide important vitamins and minerals. High-fiber foods include whole-grain cereals and breads, oatmeal, beans, brown rice, citrus fruits, and apples.     Eat lean proteins. Heart-healthy proteins include seafood, lean meats and poultry, eggs, beans, peas, nuts, seeds, and soy products.     Limit drinks and foods with added sugar. These include candy, desserts, and soda pop. Lifestyle changes    If your doctor recommends it, get more exercise. Walking is a good choice. Bit by bit, increase the amount you walk every day. Try for at least 30 minutes on most days of the week. You also may want to swim, bike, or do other activities.     Do not smoke. If you need help quitting, talk to your doctor about stop-smoking programs and medicines. These can increase your chances of quitting for good. Quitting smoking may be the most important step you can take to protect your heart. It is never too late to quit.     Limit alcohol to 2 drinks a day for men and 1 drink a day for women. Too much alcohol can cause health problems.     Manage other health problems such as diabetes, high blood pressure, and high cholesterol. If you think you may have a problem with alcohol or drug use, talk to your doctor. Medicines    Take your medicines exactly as prescribed. Call your doctor if you think you are having a problem with your medicine.     If your doctor recommends aspirin, take the amount directed each day. Make sure you take aspirin and not another kind of pain reliever, such as acetaminophen (Tylenol). When should you call for help? Call 911 if you have symptoms of a heart attack. These may include:    Chest pain or pressure, or a strange feeling in the chest.     Sweating.     Shortness of breath.     Pain, pressure, or a strange feeling in the back, neck, jaw, or upper belly or in one or both shoulders or arms.     Lightheadedness or sudden weakness.     A fast or irregular heartbeat. After you call 911, the  may tell you to chew 1 adult-strength or 2 to 4 low-dose aspirin. Wait for an ambulance. Do not try to drive yourself. Watch closely for changes in your health, and be sure to contact your doctor if you have any problems. Where can you learn more? Go to http://www.carrizales.com/ and enter F075 to learn more about \"A Healthy Heart: Care Instructions. \"  Current as of: September 7, 2022               Content Version: 13.5  © 5277-3495 Healthwise, [employer]. Care instructions adapted under license by [facility] [address] If you have questions about a medical condition or this instruction, always ask your healthcare professional. [address] any warranty or liability for your use of this information. Personalized Preventive Plan for Enrico Patterson - 2/15/2023  Medicare offers a range of preventive health benefits. Some of the tests and screenings are paid in full while other may be subject to a deductible, co-insurance, and/or copay. Some of these benefits include a comprehensive review of your medical history including lifestyle, illnesses that may run in your family, and various assessments and screenings as appropriate. After reviewing your medical record and screening and assessments performed today your provider may have ordered immunizations, labs, imaging, and/or referrals for you. A list of these orders (if applicable) as well as your Preventive Care list are included within your After Visit Summary for your review. Other Preventive Recommendations:    A preventive eye exam performed by an eye specialist is recommended every 1-2 years to screen for glaucoma; cataracts, macular degeneration, and other eye disorders. A preventive dental visit is recommended every 6 months. Try to get at least 150 minutes of exercise per week or 10,000 steps per day on a pedometer . Order or download the FREE \"Exercise & Physical Activity: Your Everyday Guide\" from [employer]. Call 3-954.117.2746 or search [employer] online. You need 4509-7330 mg of calcium and 6802-8945 IU of vitamin D per day.  It is possible to meet your calcium requirement with diet alone, but a vitamin D supplement is usually necessary to meet this goal.  When exposed to the sun, use a sunscreen that protects against both UVA and UVB radiation with an SPF of 30 or greater. Reapply every 2 to 3 hours or after sweating, drying off with a towel, or swimming. Always wear a seat belt when traveling in a car. Always wear a helmet when riding a bicycle or motorcycle.

## 2023-07-18 ENCOUNTER — TELEPHONE (OUTPATIENT)
Dept: PRIMARY CARE CLINIC | Age: 73
End: 2023-07-18

## 2023-07-18 DIAGNOSIS — R09.81 SINUS CONGESTION: Primary | ICD-10-CM

## 2023-07-18 RX ORDER — METHYLPREDNISOLONE 4 MG/1
TABLET ORAL
Qty: 1 KIT | Refills: 0 | Status: SHIPPED | OUTPATIENT
Start: 2023-07-18 | End: 2023-07-24

## 2023-07-18 NOTE — TELEPHONE ENCOUNTER
Pt wife called in stating for a week he has been extremely congested. It is all in his head- nothing going into the chest yet. He has been unable to use his cpap or sleep at night due to all the congestion. He has tried multiple things over the counter with no relief.  Will you call something in?

## 2023-07-18 NOTE — TELEPHONE ENCOUNTER
If not allergic to steroids I called in a prescription for Medrol Dosepak. Try that see if that relieves his congestion. He should take it along with his Claritin or Allegra .   If no better then he should come in through express

## 2023-08-16 ENCOUNTER — TELEPHONE (OUTPATIENT)
Dept: PRIMARY CARE CLINIC | Age: 73
End: 2023-08-16

## 2023-08-16 ENCOUNTER — OFFICE VISIT (OUTPATIENT)
Dept: PRIMARY CARE CLINIC | Age: 73
End: 2023-08-16
Payer: MEDICARE

## 2023-08-16 VITALS
TEMPERATURE: 97.8 F | WEIGHT: 273 LBS | SYSTOLIC BLOOD PRESSURE: 120 MMHG | HEART RATE: 77 BPM | DIASTOLIC BLOOD PRESSURE: 64 MMHG | HEIGHT: 73 IN | OXYGEN SATURATION: 94 % | BODY MASS INDEX: 36.18 KG/M2

## 2023-08-16 DIAGNOSIS — E66.01 SEVERE OBESITY (BMI 35.0-39.9) WITH COMORBIDITY (HCC): ICD-10-CM

## 2023-08-16 DIAGNOSIS — E78.2 MIXED HYPERLIPIDEMIA: ICD-10-CM

## 2023-08-16 DIAGNOSIS — J44.9 CHRONIC OBSTRUCTIVE PULMONARY DISEASE, UNSPECIFIED COPD TYPE (HCC): ICD-10-CM

## 2023-08-16 DIAGNOSIS — I48.11 LONGSTANDING PERSISTENT ATRIAL FIBRILLATION (HCC): ICD-10-CM

## 2023-08-16 DIAGNOSIS — F32.1 CURRENT MODERATE EPISODE OF MAJOR DEPRESSIVE DISORDER WITHOUT PRIOR EPISODE (HCC): ICD-10-CM

## 2023-08-16 DIAGNOSIS — R09.81 NASAL CONGESTION: ICD-10-CM

## 2023-08-16 DIAGNOSIS — R06.02 SHORTNESS OF BREATH: ICD-10-CM

## 2023-08-16 DIAGNOSIS — I10 ESSENTIAL HYPERTENSION: Primary | ICD-10-CM

## 2023-08-16 PROBLEM — M99.51 INTERVERTEBRAL DISC STENOSIS OF NEURAL CANAL OF CERVICAL REGION: Status: ACTIVE | Noted: 2023-08-16

## 2023-08-16 PROBLEM — R60.9 EDEMA, UNSPECIFIED: Status: ACTIVE | Noted: 2023-08-16

## 2023-08-16 PROBLEM — G47.00 INSOMNIA, UNSPECIFIED: Status: ACTIVE | Noted: 2023-08-16

## 2023-08-16 PROCEDURE — G8417 CALC BMI ABV UP PARAM F/U: HCPCS | Performed by: INTERNAL MEDICINE

## 2023-08-16 PROCEDURE — 4004F PT TOBACCO SCREEN RCVD TLK: CPT | Performed by: INTERNAL MEDICINE

## 2023-08-16 PROCEDURE — 3017F COLORECTAL CA SCREEN DOC REV: CPT | Performed by: INTERNAL MEDICINE

## 2023-08-16 PROCEDURE — 3074F SYST BP LT 130 MM HG: CPT | Performed by: INTERNAL MEDICINE

## 2023-08-16 PROCEDURE — 99214 OFFICE O/P EST MOD 30 MIN: CPT | Performed by: INTERNAL MEDICINE

## 2023-08-16 PROCEDURE — 3023F SPIROM DOC REV: CPT | Performed by: INTERNAL MEDICINE

## 2023-08-16 PROCEDURE — G8427 DOCREV CUR MEDS BY ELIG CLIN: HCPCS | Performed by: INTERNAL MEDICINE

## 2023-08-16 PROCEDURE — 1123F ACP DISCUSS/DSCN MKR DOCD: CPT | Performed by: INTERNAL MEDICINE

## 2023-08-16 PROCEDURE — 3078F DIAST BP <80 MM HG: CPT | Performed by: INTERNAL MEDICINE

## 2023-08-16 RX ORDER — OMEGA-3/DHA/EPA/FISH OIL 300-1000MG
2 CAPSULE ORAL 2 TIMES DAILY
COMMUNITY

## 2023-08-16 RX ORDER — AZELASTINE 1 MG/ML
2 SPRAY, METERED NASAL 2 TIMES DAILY
Qty: 60 ML | Refills: 5 | Status: SHIPPED | OUTPATIENT
Start: 2023-08-16

## 2023-08-16 RX ORDER — TOPIRAMATE 25 MG/1
TABLET ORAL
COMMUNITY
Start: 2023-08-14 | End: 2023-08-16 | Stop reason: SINTOL

## 2023-08-16 ASSESSMENT — ENCOUNTER SYMPTOMS
TROUBLE SWALLOWING: 0
WHEEZING: 0
EYE ITCHING: 0
COLOR CHANGE: 0
SHORTNESS OF BREATH: 1
BLOOD IN STOOL: 0
COUGH: 1
PHOTOPHOBIA: 0
APNEA: 0
RHINORRHEA: 0
NAUSEA: 0
ANAL BLEEDING: 0
EYE PAIN: 0
CONSTIPATION: 0
SORE THROAT: 0
FACIAL SWELLING: 0
STRIDOR: 0
VOMITING: 0
DIARRHEA: 1
EYE DISCHARGE: 0
ABDOMINAL PAIN: 0

## 2023-08-16 NOTE — ASSESSMENT & PLAN NOTE
discussed need to lose weight as this would help a lot of his medical problems.   Patient is not willing to try and lose weight at this time

## 2023-08-16 NOTE — PROGRESS NOTES
2023    Name: Tamica Leary : 1950 Sex: male  Age: 68 y.o. Subjective:  No chief complaint on file. Patient usually goes in the [address] [place] [address] both at [facility] and in [facility]. He was sent by his Lawerence Medin Dr. Coates Led to neurologist Dr. Jeremie Mott at [facility]. 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 He was then sent to Dr. Dipti Ellis and the ultrasound showed that the mass was too deep to biopsy. They will  keep an eye on it. He complains about his leg swelling and getting red. No actual pain. [facility] felt it might be caused by his amlodipine so they just decreased amlodipine to 5 mg and kept him on his losartan and metoprolol. Unfortunately his blood pressure started to rise so they restarted his amlodipine at 10 mg.  I asked about diuretic therapy and patient says that he is intolerant to all the diuretics they tried. It makes him groggy and not feel well. He was sent to a cardiologist at the [address] [place] [address] at [facility] and they attempted to do an echocardiogram on him. They were unable to finish it as he could not lie flat. He has an appointment to see the cardiologist in October. We will get the results of his echocardiogram.    He also had an MRI of his cervical spine where he was diagnosed with spinal stenosis. We will get the report of that as well. He was evaluated for his chronic daily headaches by neurologist.  He felt that these were more tension type headaches from muscle spasms from the back of his neck. He was prescribed cyclobenzaprine which he has used on a as needed basis and he says his headaches have markedly improved. He is also scheduled for some type of \"lung study\" at the [address] [place] [address]. I think it is for his pulmonary nodules. We will get a copy of that once is done. He has a history of COPD first diagnosed at the [address] [place] [address]. He is not on any medications for this.   He continues to smoke daily and refuses to quit

## 2023-08-16 NOTE — TELEPHONE ENCOUNTER
Instructions given to  to request those specific tests and most recent labs. Patient was stopping to sign that LEELEE.

## 2023-08-16 NOTE — ASSESSMENT & PLAN NOTE
at goal.  We will DC amlodipine because it might be contributing to his leg edema. Increase losartan to 100 mg a day and continue his metoprolol tartrate 50 mg daily. Monitor his blood pressure and if it goes up we may need to increase his metoprolol.

## 2023-08-16 NOTE — PATIENT INSTRUCTIONS
Stop Amlodipine   Increase Losartan to 100 mg ( you can take 2 pills of 50 mg once a day )  Chest x ray at [facility] blood pressure in AM 3 times a week

## 2023-08-16 NOTE — ASSESSMENT & PLAN NOTE
unknown if at goal as I have not been able to get any lab reports from the [place].   We will attempt to get the lab reports in the meantime [facility] with [facility] and continue his rosuvastatin 5 mg daily and omega-3 fish oil

## 2023-08-16 NOTE — ASSESSMENT & PLAN NOTE
multifactorial but I will get a chest x-ray on him to make sure he does not have any signs infiltrates or other problems

## 2023-08-18 ENCOUNTER — TELEPHONE (OUTPATIENT)
Dept: PRIMARY CARE CLINIC | Age: 73
End: 2023-08-18

## 2023-08-18 NOTE — TELEPHONE ENCOUNTER
Last Appointment:  8/16/2023  Future Appointments   Date Time Provider [address]   11/8/2023  2:00 PM [address]      Wife called she want to clarify Losartan dosage. She is to give patient 50 mg in morning  and 50 mg in evening. Or did you want him to take 100 mg in morning? Please advise.     Electronically signed by Joey Limon LPN on 0/38/3201 at 9:96 AM

## 2023-08-18 NOTE — TELEPHONE ENCOUNTER
Wife informed and was able to verbalize understanding of dosage.     Electronically signed by Dustin Pittman LPN on 7/48/0078 at 13:10 AM

## 2023-08-21 ENCOUNTER — TELEPHONE (OUTPATIENT)
Dept: PRIMARY CARE CLINIC | Age: 73
End: 2023-08-21

## 2023-08-21 NOTE — TELEPHONE ENCOUNTER
Patient went to ER on Saturday night because he was urinating blood. He was sent home on catheter due to the blood clotting. The cath is leaking and he is in pain. He is also still passing clots today. She is waiting on Dr. Jonathan Ling is call them back. She was unsure what to do. I suggested that he may need to go back to the ER if she didn't hear from Dr. Jonathan Ling soon. She was calling for your opinion as well.

## 2023-08-21 NOTE — TELEPHONE ENCOUNTER
He has an appt tomorrow at 9:30. His wife was questioning if he should take his blood thinner. He did not take it on Saturday or Sunday. He is still bleeding so she wasn't sure if he should take it today.

## 2023-08-28 ENCOUNTER — TELEPHONE (OUTPATIENT)
Dept: PRIMARY CARE CLINIC | Age: 73
End: 2023-08-28

## 2023-08-28 NOTE — TELEPHONE ENCOUNTER
Last Appointment:  8/16/2023  Future Appointments   Date Time Provider [address]   11/8/2023  2:00 PM [address]      Wife called concerned about patient. She reports he has been very confused/not sleeping/having verbal outbursts. Advised to go to ER. Wife states she doesn't want to have to take him. She wants an appointment if possible. Informed her there is not any available. She requests I speak with Dr. Diana Mccullough to find out if she can \"squeeze\" him in. Spoke with Dr. Diana Mccullough who advised ER as well. Informed Jeannetta Boast doctor is recommending ER as well. She states she will take him.     Electronically signed by Connor Bynum LPN on 6/30/9441 at 31:71 AM

## 2023-08-31 ENCOUNTER — OFFICE VISIT (OUTPATIENT)
Dept: PRIMARY CARE CLINIC | Age: 73
End: 2023-08-31
Payer: MEDICARE

## 2023-08-31 VITALS
OXYGEN SATURATION: 94 % | HEIGHT: 73 IN | DIASTOLIC BLOOD PRESSURE: 74 MMHG | WEIGHT: 279 LBS | SYSTOLIC BLOOD PRESSURE: 150 MMHG | HEART RATE: 100 BPM | TEMPERATURE: 98.7 F | BODY MASS INDEX: 36.98 KG/M2

## 2023-08-31 DIAGNOSIS — R41.89 COGNITIVE IMPAIRMENT: ICD-10-CM

## 2023-08-31 DIAGNOSIS — I10 ESSENTIAL HYPERTENSION: ICD-10-CM

## 2023-08-31 DIAGNOSIS — R90.89 OTHER ABNORMAL FINDINGS ON DIAGNOSTIC IMAGING OF CENTRAL NERVOUS SYSTEM: ICD-10-CM

## 2023-08-31 DIAGNOSIS — I48.11 LONGSTANDING PERSISTENT ATRIAL FIBRILLATION (HCC): ICD-10-CM

## 2023-08-31 DIAGNOSIS — S46.219A BICEPS TENDON TEAR: ICD-10-CM

## 2023-08-31 DIAGNOSIS — F51.01 PRIMARY INSOMNIA: ICD-10-CM

## 2023-08-31 DIAGNOSIS — R41.89 COGNITIVE IMPAIRMENT: Primary | ICD-10-CM

## 2023-08-31 PROBLEM — S46.209A: Status: ACTIVE | Noted: 2023-08-31

## 2023-08-31 LAB — VITAMIN B-12: 866 PG/ML (ref 211–946)

## 2023-08-31 PROCEDURE — 3017F COLORECTAL CA SCREEN DOC REV: CPT | Performed by: INTERNAL MEDICINE

## 2023-08-31 PROCEDURE — 3078F DIAST BP <80 MM HG: CPT | Performed by: INTERNAL MEDICINE

## 2023-08-31 PROCEDURE — G8417 CALC BMI ABV UP PARAM F/U: HCPCS | Performed by: INTERNAL MEDICINE

## 2023-08-31 PROCEDURE — 4004F PT TOBACCO SCREEN RCVD TLK: CPT | Performed by: INTERNAL MEDICINE

## 2023-08-31 PROCEDURE — 1123F ACP DISCUSS/DSCN MKR DOCD: CPT | Performed by: INTERNAL MEDICINE

## 2023-08-31 PROCEDURE — 99214 OFFICE O/P EST MOD 30 MIN: CPT | Performed by: INTERNAL MEDICINE

## 2023-08-31 PROCEDURE — G8427 DOCREV CUR MEDS BY ELIG CLIN: HCPCS | Performed by: INTERNAL MEDICINE

## 2023-08-31 PROCEDURE — 3077F SYST BP >= 140 MM HG: CPT | Performed by: INTERNAL MEDICINE

## 2023-08-31 ASSESSMENT — ENCOUNTER SYMPTOMS
SHORTNESS OF BREATH: 0
COLOR CHANGE: 1

## 2023-08-31 NOTE — ASSESSMENT & PLAN NOTE
patient is wife will check with the [place] if they have any dementia specialist or geriatricians to evaluate him. Otherwise let me know and I will consent him to neurologist or psychologist in the area to evaluate him for dementia.   [facility] in [address] [place] [address]    Have blood work done either here or at the [facility] for TSH, vitamin B12 level and ammonia level

## 2023-08-31 NOTE — ASSESSMENT & PLAN NOTE
to see orthopedic surgeon of choice at the [place] or if he wants to stay in the area let me know and I will still refer him to an orthopedic surgeon here

## 2023-08-31 NOTE — ASSESSMENT & PLAN NOTE
Not at goal.  Continue losartan and metoprolol. May add clonidine if blood pressure has not improved within the next couple of weeks.   Wife is to monitor his blood pressures and let me know if his systolics are consistently over 140 over 1 week

## 2023-08-31 NOTE — PROGRESS NOTES
Normal heart sounds. No murmur heard. No friction rub. No gallop. Pulmonary:      Effort: Pulmonary effort is normal. No respiratory distress. Breath sounds: Normal breath sounds. No wheezing or rales. Chest:      Chest wall: No tenderness. Abdominal:      General: Bowel sounds are normal. There is no distension. Palpations: Abdomen is soft. There is no mass. Tenderness: There is no abdominal tenderness. There is no guarding or rebound. Musculoskeletal:         General: No tenderness or deformity. Normal range of motion. Cervical back: Normal range of motion and neck supple. Comments: Large ecchymosis which goes from his upper elbow to his shoulder. The area is tender and slightly swollen. It is not erythematous or warm. Radial and brachial pulses are intact.  strength is intact. He is able to lift his arm over his head   Lymphadenopathy:      Cervical: No cervical adenopathy. Skin:     General: Skin is warm and dry. Coloration: Skin is not pale. Findings: No erythema or rash. Neurological:      Mental Status: He is alert and oriented to person, place, and time. Sensory: No sensory deficit. Gait: Gait normal.   Psychiatric:         Behavior: Behavior normal.         Thought Content: Thought content normal.         Judgment: Judgment normal.      Comments: Flat affect        Last labs reviewed. ASSESSMENT & PLAN :   Problem List          Circulatory    Atrial fibrillation ([address])     Follows up with cardiology. Continue Xarelto. Relevant Medications    metoprolol tartrate (LOPRESSOR) 50 MG tablet    losartan (COZAAR) 50 MG tablet    rivaroxaban (XARELTO) 20 MG TABS tablet    rosuvastatin (CRESTOR) 5 MG tablet    Essential hypertension     Not at goal.  Continue losartan and metoprolol. May add clonidine if blood pressure has not improved within the next couple of weeks.   Wife is to monitor his blood pressures and let me know if his

## 2023-08-31 NOTE — PATIENT INSTRUCTIONS
See orthopedic surgeon of choice either here or at [facility] blood pressures  Talk with [facility] Doctor about geriatric specialist or Dementia specialist

## 2023-09-01 LAB
AMMONIA: 27 UMOL/L (ref 16–60)
TSH SERPL DL<=0.05 MIU/L-ACNC: 1.9 UIU/ML (ref 0.27–4.2)

## 2023-09-06 LAB
ANION GAP SERPL CALCULATED.3IONS-SCNC: 8 MEQ/L (ref 3–11)
BUN BLDV-MCNC: 11 MG/DL (ref 6–20)
CALCIUM SERPL-MCNC: 8.2 MG/DL (ref 8.5–10.5)
CHLORIDE BLD-SCNC: 93 MEQ/L (ref 98–107)
CO2: 27 MEQ/L (ref 21–31)
CREAT SERPL-MCNC: 0.8 MG/DL (ref 0.6–1.3)
CREATININE + EGFR PANEL: 115 ML/MIN
GFR NON-AFRICAN AMERICAN: 95 ML/MIN
GLUCOSE BLD-MCNC: 90 MG/DL (ref 70–99)
POTASSIUM SERPL-SCNC: 3.8 MEQ/L (ref 3.6–5)
SODIUM BLD-SCNC: 128 MEQ/L (ref 135–145)

## 2023-09-08 ENCOUNTER — TELEPHONE (OUTPATIENT)
Dept: PRIMARY CARE CLINIC | Age: 73
End: 2023-09-08

## 2023-10-09 ENCOUNTER — OFFICE VISIT (OUTPATIENT)
Dept: FAMILY MEDICINE CLINIC | Age: 73
End: 2023-10-09
Payer: MEDICARE

## 2023-10-09 VITALS
TEMPERATURE: 97.3 F | RESPIRATION RATE: 20 BRPM | HEART RATE: 94 BPM | HEIGHT: 73 IN | WEIGHT: 278 LBS | OXYGEN SATURATION: 95 % | DIASTOLIC BLOOD PRESSURE: 86 MMHG | BODY MASS INDEX: 36.84 KG/M2 | SYSTOLIC BLOOD PRESSURE: 138 MMHG

## 2023-10-09 DIAGNOSIS — R06.02 SOB (SHORTNESS OF BREATH): Primary | ICD-10-CM

## 2023-10-09 DIAGNOSIS — R25.2 MUSCLE CRAMPS: ICD-10-CM

## 2023-10-09 LAB
Lab: NORMAL
PERFORMING INSTRUMENT: NORMAL
QC PASS/FAIL: NORMAL
SARS-COV-2, POC: NORMAL

## 2023-10-09 PROCEDURE — 99214 OFFICE O/P EST MOD 30 MIN: CPT | Performed by: NURSE PRACTITIONER

## 2023-10-09 PROCEDURE — G8484 FLU IMMUNIZE NO ADMIN: HCPCS | Performed by: NURSE PRACTITIONER

## 2023-10-09 PROCEDURE — 1123F ACP DISCUSS/DSCN MKR DOCD: CPT | Performed by: NURSE PRACTITIONER

## 2023-10-09 PROCEDURE — G8427 DOCREV CUR MEDS BY ELIG CLIN: HCPCS | Performed by: NURSE PRACTITIONER

## 2023-10-09 PROCEDURE — 4004F PT TOBACCO SCREEN RCVD TLK: CPT | Performed by: NURSE PRACTITIONER

## 2023-10-09 PROCEDURE — 3075F SYST BP GE 130 - 139MM HG: CPT | Performed by: NURSE PRACTITIONER

## 2023-10-09 PROCEDURE — 3079F DIAST BP 80-89 MM HG: CPT | Performed by: NURSE PRACTITIONER

## 2023-10-09 PROCEDURE — 87426 SARSCOV CORONAVIRUS AG IA: CPT | Performed by: NURSE PRACTITIONER

## 2023-10-09 PROCEDURE — 3017F COLORECTAL CA SCREEN DOC REV: CPT | Performed by: NURSE PRACTITIONER

## 2023-10-09 PROCEDURE — G8417 CALC BMI ABV UP PARAM F/U: HCPCS | Performed by: NURSE PRACTITIONER

## 2023-10-09 RX ORDER — MIRTAZAPINE 15 MG/1
7.5 TABLET, FILM COATED ORAL
COMMUNITY
Start: 2023-10-02

## 2023-10-09 RX ORDER — FLUTICASONE PROPIONATE 50 MCG
SPRAY, SUSPENSION (ML) NASAL
COMMUNITY
Start: 2023-10-06

## 2023-10-09 RX ORDER — SPIRONOLACTONE 25 MG/1
12.5 TABLET ORAL
COMMUNITY
Start: 2023-10-06

## 2023-10-09 RX ORDER — FUROSEMIDE 20 MG/1
1 TABLET ORAL 2 TIMES DAILY
COMMUNITY
Start: 2023-10-06

## 2023-10-09 NOTE — PROGRESS NOTES
Imaging: All Radiology results interpreted by Radiologist unless otherwise noted. No orders to display       Assessment / Plan:   The patient's vitals, allergies, medications, and past medical history have been reviewed. Helena Dancer was seen today for shortness of breath. Diagnoses and all orders for this visit:    SOB (shortness of breath)  -     POCT COVID-19, Antigen    Muscle cramps        - Disposition: ED    - Educational material printed for patient's review and were included in patient instructions. After Visit Summary was given to patient at the end of visit. - Pt declined EKG    - Differential diagnoses were discussed with the patient today. The patient is sent to the ED for further evaluation and treatment. A comprehensive workup is recommended and unable to be performed in an ready care setting. The patient verbalizes understanding and agreed. The patient declined EMS and will go by private vehicle. The patient left our office in stable condition. Further disposition to follow. All questions answered. SIGNATURE: GRACE Nj-CNP    *NOTE: This report was transcribed using voice recognition software. Every effort was made to ensure accuracy; however, inadvertent computerized transcription errors may be present.

## 2023-11-17 ENCOUNTER — TELEPHONE (OUTPATIENT)
Dept: PRIMARY CARE CLINIC | Age: 73
End: 2023-11-17

## 2023-12-04 ENCOUNTER — OFFICE VISIT (OUTPATIENT)
Dept: PRIMARY CARE CLINIC | Age: 73
End: 2023-12-04
Payer: MEDICARE

## 2023-12-04 VITALS
HEIGHT: 73 IN | SYSTOLIC BLOOD PRESSURE: 118 MMHG | HEART RATE: 87 BPM | TEMPERATURE: 98.4 F | DIASTOLIC BLOOD PRESSURE: 62 MMHG | WEIGHT: 265 LBS | BODY MASS INDEX: 35.12 KG/M2 | OXYGEN SATURATION: 96 %

## 2023-12-04 DIAGNOSIS — F51.01 PRIMARY INSOMNIA: ICD-10-CM

## 2023-12-04 DIAGNOSIS — I10 ESSENTIAL HYPERTENSION: Primary | ICD-10-CM

## 2023-12-04 DIAGNOSIS — G47.34 HYPOXIA, SLEEP RELATED: ICD-10-CM

## 2023-12-04 DIAGNOSIS — I50.23 ACUTE ON CHRONIC SYSTOLIC (CONGESTIVE) HEART FAILURE (HCC): ICD-10-CM

## 2023-12-04 DIAGNOSIS — D50.9 IRON DEFICIENCY ANEMIA, UNSPECIFIED IRON DEFICIENCY ANEMIA TYPE: ICD-10-CM

## 2023-12-04 DIAGNOSIS — R73.9 HYPERGLYCEMIA: ICD-10-CM

## 2023-12-04 DIAGNOSIS — F32.1 CURRENT MODERATE EPISODE OF MAJOR DEPRESSIVE DISORDER WITHOUT PRIOR EPISODE (HCC): ICD-10-CM

## 2023-12-04 DIAGNOSIS — I48.11 LONGSTANDING PERSISTENT ATRIAL FIBRILLATION (HCC): ICD-10-CM

## 2023-12-04 DIAGNOSIS — R41.89 COGNITIVE IMPAIRMENT: ICD-10-CM

## 2023-12-04 DIAGNOSIS — Z91.199 PERSONAL HISTORY OF NONCOMPLIANCE WITH MEDICAL TREATMENT, PRESENTING HAZARDS TO HEALTH: ICD-10-CM

## 2023-12-04 DIAGNOSIS — J44.9 CHRONIC OBSTRUCTIVE PULMONARY DISEASE, UNSPECIFIED COPD TYPE (HCC): ICD-10-CM

## 2023-12-04 DIAGNOSIS — E78.1 PURE HYPERTRIGLYCERIDEMIA: ICD-10-CM

## 2023-12-04 PROBLEM — R31.0 GROSS HEMATURIA: Status: RESOLVED | Noted: 2023-08-22 | Resolved: 2023-12-04

## 2023-12-04 PROBLEM — S46.209A: Status: RESOLVED | Noted: 2023-08-31 | Resolved: 2023-12-04

## 2023-12-04 PROBLEM — R31.0 GROSS HEMATURIA: Status: ACTIVE | Noted: 2023-08-22

## 2023-12-04 PROBLEM — I42.8 OTHER CARDIOMYOPATHIES (HCC): Status: ACTIVE | Noted: 2023-12-04

## 2023-12-04 PROBLEM — R97.20 ELEVATED PROSTATE SPECIFIC ANTIGEN (PSA): Status: ACTIVE | Noted: 2022-10-28

## 2023-12-04 PROBLEM — K59.1 FUNCTIONAL DIARRHEA: Status: RESOLVED | Noted: 2021-02-11 | Resolved: 2023-12-04

## 2023-12-04 PROCEDURE — 99214 OFFICE O/P EST MOD 30 MIN: CPT | Performed by: INTERNAL MEDICINE

## 2023-12-04 PROCEDURE — 3023F SPIROM DOC REV: CPT | Performed by: INTERNAL MEDICINE

## 2023-12-04 PROCEDURE — 3017F COLORECTAL CA SCREEN DOC REV: CPT | Performed by: INTERNAL MEDICINE

## 2023-12-04 PROCEDURE — 3078F DIAST BP <80 MM HG: CPT | Performed by: INTERNAL MEDICINE

## 2023-12-04 PROCEDURE — 1123F ACP DISCUSS/DSCN MKR DOCD: CPT | Performed by: INTERNAL MEDICINE

## 2023-12-04 PROCEDURE — G8484 FLU IMMUNIZE NO ADMIN: HCPCS | Performed by: INTERNAL MEDICINE

## 2023-12-04 PROCEDURE — G8417 CALC BMI ABV UP PARAM F/U: HCPCS | Performed by: INTERNAL MEDICINE

## 2023-12-04 PROCEDURE — 3074F SYST BP LT 130 MM HG: CPT | Performed by: INTERNAL MEDICINE

## 2023-12-04 PROCEDURE — G8427 DOCREV CUR MEDS BY ELIG CLIN: HCPCS | Performed by: INTERNAL MEDICINE

## 2023-12-04 PROCEDURE — 4004F PT TOBACCO SCREEN RCVD TLK: CPT | Performed by: INTERNAL MEDICINE

## 2023-12-04 RX ORDER — PEG-3350, SODIUM SULFATE, SODIUM CHLORIDE, POTASSIUM CHLORIDE, SODIUM ASCORBATE AND ASCORBIC ACID 7.5-2.691G
KIT ORAL
COMMUNITY
Start: 2023-11-20

## 2023-12-04 RX ORDER — MAGNESIUM CHLORIDE 71.5 G/G
TABLET ORAL
COMMUNITY

## 2023-12-04 RX ORDER — BUMETANIDE 2 MG/1
1 TABLET ORAL DAILY
COMMUNITY
Start: 2023-11-14

## 2023-12-04 RX ORDER — METOPROLOL SUCCINATE 25 MG/1
25 TABLET, EXTENDED RELEASE ORAL 2 TIMES DAILY
COMMUNITY
Start: 2023-11-14

## 2023-12-04 ASSESSMENT — ENCOUNTER SYMPTOMS
SORE THROAT: 0
EYE PAIN: 0
WHEEZING: 0
FACIAL SWELLING: 0
RHINORRHEA: 0
EYE ITCHING: 0
DIARRHEA: 0
CONSTIPATION: 0
ABDOMINAL PAIN: 0
VOMITING: 0
BLOOD IN STOOL: 0
STRIDOR: 0
SHORTNESS OF BREATH: 0
NAUSEA: 0
COLOR CHANGE: 0
PHOTOPHOBIA: 0
TROUBLE SWALLOWING: 0
EYE DISCHARGE: 0
ANAL BLEEDING: 0
COUGH: 0

## 2023-12-04 NOTE — PROGRESS NOTES
Reactions    Buchu-Cornsilk-Ch Grass-Hydran Other (See Comments)     delirium    Tricyclic Antidepressants Other (See Comments)     delirium    Furosemide     Gemfibrozil     Hydrochlorothiazide Other (See Comments) and Nausea Only     Other reaction(s): NAUSEA,VOMITING, Dizziness, Weakness present    Morphine     Sertraline      Other reaction(s): Dizziness    Statins     Sulfa Antibiotics     Citalopram Other (See Comments) and Nausea And Vomiting     Other reaction(s): NAUSEA,VOMITING, Dizziness, Weakness present        Past Medical History:   Diagnosis Date    Atrial fibrillation (HCC)     Hyperlipidemia     Neuropathy     Osteoarthritis        Health Maintenance Due   Topic Date Due    AAA screen  Never done        Patient Active Problem List   Diagnosis    Atrial fibrillation ([address])    Essential hypertension    Neuropathy    Osteoarthritis    Hypoxia, sleep related    LIZ (obstructive sleep apnea)    Pure hypertriglyceridemia    Smokes tobacco daily    Personal history of noncompliance with medical treatment, presenting hazards to health    Normal colonoscopy    Current moderate episode of major depressive disorder without prior episode (HCC)    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

## 2023-12-04 NOTE — ASSESSMENT & PLAN NOTE
continue his mirtazapine.   Work on his obstructive sleep apnea and getting either inspire procedure or different mask that works

## 2023-12-06 DIAGNOSIS — E78.1 PURE HYPERTRIGLYCERIDEMIA: ICD-10-CM

## 2023-12-06 DIAGNOSIS — R73.9 HYPERGLYCEMIA: ICD-10-CM

## 2023-12-06 DIAGNOSIS — D50.9 IRON DEFICIENCY ANEMIA, UNSPECIFIED IRON DEFICIENCY ANEMIA TYPE: ICD-10-CM

## 2023-12-06 DIAGNOSIS — I10 ESSENTIAL HYPERTENSION: ICD-10-CM

## 2023-12-06 LAB
ALBUMIN SERPL-MCNC: 3.9 G/DL (ref 3.5–5.2)
ALP BLD-CCNC: 89 U/L (ref 40–129)
ALT SERPL-CCNC: 40 U/L (ref 0–40)
ANION GAP SERPL CALCULATED.3IONS-SCNC: 16 MMOL/L (ref 7–16)
AST SERPL-CCNC: 41 U/L (ref 0–39)
BILIRUB SERPL-MCNC: 0.2 MG/DL (ref 0–1.2)
BUN BLDV-MCNC: 16 MG/DL (ref 6–23)
CALCIUM SERPL-MCNC: 9.5 MG/DL (ref 8.6–10.2)
CHLORIDE BLD-SCNC: 104 MMOL/L (ref 98–107)
CHOLESTEROL: 149 MG/DL
CO2: 22 MMOL/L (ref 22–29)
CREAT SERPL-MCNC: 0.8 MG/DL (ref 0.7–1.2)
FERRITIN: 120 NG/ML
GFR SERPL CREATININE-BSD FRML MDRD: >60 ML/MIN/1.73M2
GLUCOSE BLD-MCNC: 106 MG/DL (ref 74–99)
HBA1C MFR BLD: 5.6 % (ref 4–5.6)
HDLC SERPL-MCNC: 32 MG/DL
IRON % SATURATION: 24 % (ref 20–55)
IRON: 88 UG/DL (ref 59–158)
LDL CHOLESTEROL: 70 MG/DL
POTASSIUM SERPL-SCNC: 4.3 MMOL/L (ref 3.5–5)
SODIUM BLD-SCNC: 142 MMOL/L (ref 132–146)
TOTAL IRON BINDING CAPACITY: 365 UG/DL (ref 250–450)
TOTAL PROTEIN: 7.2 G/DL (ref 6.4–8.3)
TRIGL SERPL-MCNC: 233 MG/DL
VLDLC SERPL CALC-MCNC: 47 MG/DL

## 2023-12-07 LAB
ABSOLUTE IMMATURE GRANULOCYTE: <0.03 K/UL (ref 0–0.58)
BASOPHILS ABSOLUTE: 0.06 K/UL (ref 0–0.2)
BASOPHILS RELATIVE PERCENT: 1 % (ref 0–2)
EOSINOPHILS ABSOLUTE: 0.19 K/UL (ref 0.05–0.5)
EOSINOPHILS RELATIVE PERCENT: 2 % (ref 0–6)
HCT VFR BLD CALC: 43.5 % (ref 37–54)
HEMOGLOBIN: 13.6 G/DL (ref 12.5–16.5)
IMMATURE GRANULOCYTES: 0 % (ref 0–5)
LYMPHOCYTES ABSOLUTE: 2.55 K/UL (ref 1.5–4)
LYMPHOCYTES RELATIVE PERCENT: 31 % (ref 20–42)
MCH RBC QN AUTO: 27.4 PG (ref 26–35)
MCHC RBC AUTO-ENTMCNC: 31.3 G/DL (ref 32–34.5)
MCV RBC AUTO: 87.7 FL (ref 80–99.9)
MONOCYTES ABSOLUTE: 0.83 K/UL (ref 0.1–0.95)
MONOCYTES RELATIVE PERCENT: 10 % (ref 2–12)
NEUTROPHILS ABSOLUTE: 4.68 K/UL (ref 1.8–7.3)
NEUTROPHILS RELATIVE PERCENT: 56 % (ref 43–80)
PDW BLD-RTO: 24.3 % (ref 11.5–15)
PLATELET # BLD: 199 K/UL (ref 130–450)
PMV BLD AUTO: 10.9 FL (ref 7–12)
RBC # BLD: 4.96 M/UL (ref 3.8–5.8)
RBC # BLD: ABNORMAL 10*6/UL
WBC # BLD: 8.3 K/UL (ref 4.5–11.5)
WBC # BLD: ABNORMAL 10*3/UL

## 2024-03-05 ENCOUNTER — OFFICE VISIT (OUTPATIENT)
Dept: PRIMARY CARE CLINIC | Age: 74
End: 2024-03-05
Payer: MEDICARE

## 2024-03-05 VITALS
SYSTOLIC BLOOD PRESSURE: 120 MMHG | DIASTOLIC BLOOD PRESSURE: 78 MMHG | WEIGHT: 262 LBS | OXYGEN SATURATION: 93 % | HEART RATE: 50 BPM | BODY MASS INDEX: 34.72 KG/M2 | RESPIRATION RATE: 18 BRPM | HEIGHT: 73 IN | TEMPERATURE: 97.2 F

## 2024-03-05 DIAGNOSIS — J44.9 CHRONIC OBSTRUCTIVE PULMONARY DISEASE, UNSPECIFIED COPD TYPE (HCC): ICD-10-CM

## 2024-03-05 DIAGNOSIS — I48.11 LONGSTANDING PERSISTENT ATRIAL FIBRILLATION (HCC): ICD-10-CM

## 2024-03-05 DIAGNOSIS — F32.1 CURRENT MODERATE EPISODE OF MAJOR DEPRESSIVE DISORDER WITHOUT PRIOR EPISODE (HCC): ICD-10-CM

## 2024-03-05 PROBLEM — I42.9 CARDIOMYOPATHY (HCC): Status: RESOLVED | Noted: 2023-12-04 | Resolved: 2024-03-05

## 2024-03-05 PROBLEM — E78.5 HYPERLIPIDEMIA: Status: ACTIVE | Noted: 2024-03-05

## 2024-03-05 PROBLEM — R90.89 OTHER ABNORMAL FINDINGS ON DIAGNOSTIC IMAGING OF CENTRAL NERVOUS SYSTEM: Status: RESOLVED | Noted: 2023-08-31 | Resolved: 2024-03-05

## 2024-03-05 PROBLEM — Z91.199 PERSONAL HISTORY OF NONCOMPLIANCE WITH MEDICAL TREATMENT, PRESENTING HAZARDS TO HEALTH: Status: RESOLVED | Noted: 2021-08-26 | Resolved: 2024-03-05

## 2024-03-05 PROBLEM — R06.02 SHORTNESS OF BREATH: Status: RESOLVED | Noted: 2023-08-16 | Resolved: 2024-03-05

## 2024-03-05 PROBLEM — I42.9 CARDIOMYOPATHY (HCC): Status: ACTIVE | Noted: 2023-12-04

## 2024-03-05 PROBLEM — F32.A DEPRESSION: Status: RESOLVED | Noted: 2021-12-02 | Resolved: 2024-03-05

## 2024-03-05 PROBLEM — R41.9 UNSPECIFIED SYMPTOMS AND SIGNS INVOLVING COGNITIVE FUNCTIONS AND AWARENESS: Status: ACTIVE | Noted: 2021-08-26

## 2024-03-05 PROBLEM — R09.02 HYPOXIA: Status: ACTIVE | Noted: 2021-02-11

## 2024-03-05 PROBLEM — R22.9 LOCALIZED SWELLING, MASS AND LUMP, UNSPECIFIED: Status: ACTIVE | Noted: 2023-08-16

## 2024-03-05 PROBLEM — E66.9 OBESITY: Status: ACTIVE | Noted: 2023-02-15

## 2024-03-05 PROBLEM — E66.01 SEVERE OBESITY (BMI 35.0-39.9) WITH COMORBIDITY (HCC): Status: RESOLVED | Noted: 2023-02-15 | Resolved: 2024-03-05

## 2024-03-05 PROBLEM — G47.34 HYPOXIA, SLEEP RELATED: Status: RESOLVED | Noted: 2021-02-11 | Resolved: 2024-03-05

## 2024-03-05 PROBLEM — I50.23 ACUTE ON CHRONIC SYSTOLIC (CONGESTIVE) HEART FAILURE (HCC): Status: RESOLVED | Noted: 2023-12-04 | Resolved: 2024-03-05

## 2024-03-05 PROBLEM — S49.90XA INJURY OF UPPER ARM: Status: ACTIVE | Noted: 2023-08-31

## 2024-03-05 PROCEDURE — 4004F PT TOBACCO SCREEN RCVD TLK: CPT | Performed by: STUDENT IN AN ORGANIZED HEALTH CARE EDUCATION/TRAINING PROGRAM

## 2024-03-05 PROCEDURE — 3017F COLORECTAL CA SCREEN DOC REV: CPT | Performed by: STUDENT IN AN ORGANIZED HEALTH CARE EDUCATION/TRAINING PROGRAM

## 2024-03-05 PROCEDURE — G8417 CALC BMI ABV UP PARAM F/U: HCPCS | Performed by: STUDENT IN AN ORGANIZED HEALTH CARE EDUCATION/TRAINING PROGRAM

## 2024-03-05 PROCEDURE — 3078F DIAST BP <80 MM HG: CPT | Performed by: STUDENT IN AN ORGANIZED HEALTH CARE EDUCATION/TRAINING PROGRAM

## 2024-03-05 PROCEDURE — G8427 DOCREV CUR MEDS BY ELIG CLIN: HCPCS | Performed by: STUDENT IN AN ORGANIZED HEALTH CARE EDUCATION/TRAINING PROGRAM

## 2024-03-05 PROCEDURE — 3023F SPIROM DOC REV: CPT | Performed by: STUDENT IN AN ORGANIZED HEALTH CARE EDUCATION/TRAINING PROGRAM

## 2024-03-05 PROCEDURE — 3074F SYST BP LT 130 MM HG: CPT | Performed by: STUDENT IN AN ORGANIZED HEALTH CARE EDUCATION/TRAINING PROGRAM

## 2024-03-05 PROCEDURE — G8484 FLU IMMUNIZE NO ADMIN: HCPCS | Performed by: STUDENT IN AN ORGANIZED HEALTH CARE EDUCATION/TRAINING PROGRAM

## 2024-03-05 PROCEDURE — 1123F ACP DISCUSS/DSCN MKR DOCD: CPT | Performed by: STUDENT IN AN ORGANIZED HEALTH CARE EDUCATION/TRAINING PROGRAM

## 2024-03-05 PROCEDURE — 99213 OFFICE O/P EST LOW 20 MIN: CPT | Performed by: STUDENT IN AN ORGANIZED HEALTH CARE EDUCATION/TRAINING PROGRAM

## 2024-03-05 RX ORDER — POTASSIUM CHLORIDE 1500 MG/1
20 TABLET, EXTENDED RELEASE ORAL 2 TIMES DAILY
COMMUNITY
Start: 2024-02-25

## 2024-03-05 RX ORDER — SACUBITRIL AND VALSARTAN 24; 26 MG/1; MG/1
TABLET, FILM COATED ORAL
COMMUNITY
Start: 2024-01-08

## 2024-03-05 RX ORDER — OMEPRAZOLE 40 MG/1
CAPSULE, DELAYED RELEASE ORAL
COMMUNITY
Start: 2024-01-15

## 2024-03-05 SDOH — ECONOMIC STABILITY: FOOD INSECURITY: WITHIN THE PAST 12 MONTHS, THE FOOD YOU BOUGHT JUST DIDN'T LAST AND YOU DIDN'T HAVE MONEY TO GET MORE.: NEVER TRUE

## 2024-03-05 SDOH — ECONOMIC STABILITY: FOOD INSECURITY: WITHIN THE PAST 12 MONTHS, YOU WORRIED THAT YOUR FOOD WOULD RUN OUT BEFORE YOU GOT MONEY TO BUY MORE.: NEVER TRUE

## 2024-03-05 SDOH — ECONOMIC STABILITY: INCOME INSECURITY: HOW HARD IS IT FOR YOU TO PAY FOR THE VERY BASICS LIKE FOOD, HOUSING, MEDICAL CARE, AND HEATING?: NOT VERY HARD

## 2024-03-05 ASSESSMENT — PATIENT HEALTH QUESTIONNAIRE - PHQ9
1. LITTLE INTEREST OR PLEASURE IN DOING THINGS: 1
9. THOUGHTS THAT YOU WOULD BE BETTER OFF DEAD, OR OF HURTING YOURSELF: 0
7. TROUBLE CONCENTRATING ON THINGS, SUCH AS READING THE NEWSPAPER OR WATCHING TELEVISION: 0
6. FEELING BAD ABOUT YOURSELF - OR THAT YOU ARE A FAILURE OR HAVE LET YOURSELF OR YOUR FAMILY DOWN: 0
SUM OF ALL RESPONSES TO PHQ QUESTIONS 1-9: 3
4. FEELING TIRED OR HAVING LITTLE ENERGY: 0
SUM OF ALL RESPONSES TO PHQ9 QUESTIONS 1 & 2: 2
SUM OF ALL RESPONSES TO PHQ QUESTIONS 1-9: 3
8. MOVING OR SPEAKING SO SLOWLY THAT OTHER PEOPLE COULD HAVE NOTICED. OR THE OPPOSITE, BEING SO FIGETY OR RESTLESS THAT YOU HAVE BEEN MOVING AROUND A LOT MORE THAN USUAL: 0
SUM OF ALL RESPONSES TO PHQ QUESTIONS 1-9: 3
5. POOR APPETITE OR OVEREATING: 0
3. TROUBLE FALLING OR STAYING ASLEEP: 1
2. FEELING DOWN, DEPRESSED OR HOPELESS: 1
SUM OF ALL RESPONSES TO PHQ QUESTIONS 1-9: 3
10. IF YOU CHECKED OFF ANY PROBLEMS, HOW DIFFICULT HAVE THESE PROBLEMS MADE IT FOR YOU TO DO YOUR WORK, TAKE CARE OF THINGS AT HOME, OR GET ALONG WITH OTHER PEOPLE: 0

## 2024-03-05 NOTE — PROGRESS NOTES
omeprazole (PRILOSEC) 40 MG delayed release capsule       potassium chloride (K-TAB) 20 MEQ TBCR extended release tablet Take 1 tablet by mouth 2 times daily      FERROUS SULFATE PO Take by mouth      bumetanide (BUMEX) 2 MG tablet Take 1 tablet by mouth 2 times daily      metoprolol succinate (TOPROL XL) 25 MG extended release tablet Take 1 tablet by mouth 2 times daily      fluticasone (FLONASE) 50 MCG/ACT nasal spray USE 2 SPRAYS IN EACH NOSTRIL EVERY DAY      spironolactone (ALDACTONE) 25 MG tablet 1 tablet      triamcinolone (KENALOG) 0.1 % cream APPLY A SMALL AMOUNT EXTERNALLY TWICE A DAY TO BACK      cyclobenzaprine (FLEXERIL) 10 MG tablet 1 tablet      Handicap Placard MISC by Does not apply route 1 each 0    Cholecalciferol 50 MCG (2000 UT) TABS Take 1 tablet by mouth daily      rosuvastatin (CRESTOR) 5 MG tablet TAKE ONE-HALF TABLET BY MOUTH EVERY DAY      Omega-3 Fatty Acids (FISH OIL) 1000 MG CAPS Take 2 capsules by mouth daily      Multiple Vitamins-Minerals (MULTI FOR HIM) PACK Take by mouth 2 times daily      rivaroxaban (XARELTO) 20 MG TABS tablet Take 1 tablet by mouth daily       No current facility-administered medications for this visit.            ROS   Reviewed as above, otherwise negative       Physical Exam   Vitals:   Vitals:    03/05/24 1253   BP: 120/78   Pulse: 50   Resp: 18   Temp: 97.2 °F (36.2 °C)   SpO2: 93%       Physical Exam  Vitals reviewed.   Constitutional:       Appearance: Normal appearance.   HENT:      Head: Normocephalic and atraumatic.   Cardiovascular:      Rate and Rhythm: Normal rate. Rhythm irregularly irregular.      Pulses: Normal pulses.      Heart sounds: Normal heart sounds.   Pulmonary:      Effort: Pulmonary effort is normal.      Breath sounds: Wheezing present.   Abdominal:      General: Bowel sounds are normal.      Palpations: Abdomen is soft.   Neurological:      Mental Status: He is alert.   Psychiatric:         Mood and Affect: Mood normal.

## 2024-03-20 ENCOUNTER — OFFICE VISIT (OUTPATIENT)
Dept: FAMILY MEDICINE CLINIC | Age: 74
End: 2024-03-20

## 2024-03-20 VITALS
SYSTOLIC BLOOD PRESSURE: 130 MMHG | WEIGHT: 262 LBS | RESPIRATION RATE: 18 BRPM | DIASTOLIC BLOOD PRESSURE: 68 MMHG | HEIGHT: 73 IN | TEMPERATURE: 97.7 F | HEART RATE: 77 BPM | BODY MASS INDEX: 34.72 KG/M2 | OXYGEN SATURATION: 97 %

## 2024-03-20 DIAGNOSIS — K61.2 ABSCESS OF ANAL OR RECTAL REGION: Primary | ICD-10-CM

## 2024-03-20 RX ORDER — CEPHALEXIN 500 MG/1
500 CAPSULE ORAL 2 TIMES DAILY
Qty: 20 CAPSULE | Refills: 0 | Status: SHIPPED | OUTPATIENT
Start: 2024-03-20 | End: 2024-03-30

## 2024-03-20 RX ORDER — DOXYCYCLINE HYCLATE 100 MG
100 TABLET ORAL 2 TIMES DAILY
Qty: 20 TABLET | Refills: 0 | Status: SHIPPED | OUTPATIENT
Start: 2024-03-20 | End: 2024-03-30

## 2024-03-20 ASSESSMENT — ENCOUNTER SYMPTOMS
WHEEZING: 0
ABDOMINAL PAIN: 0
CONSTIPATION: 0
NAUSEA: 0
COUGH: 0
DIARRHEA: 0
SHORTNESS OF BREATH: 0

## 2024-03-20 NOTE — PROGRESS NOTES
2024     Clement Palacio 74 y.o. male   : 1950  Chief Complaint:   Other       History of Present Illness:   Clement Palacio is a 74 y.o. male who presents to the office with complaints of wound to left buttocks X 2 days. The wound initially expressed purulent drainage, no recent drainage. No fever or chills noted.   Has tried JACE,  and hemorrhoid cream with minimal relief.     Past Medical History:     Past Medical History:   Diagnosis Date    Acute on chronic systolic (congestive) heart failure (HCC) 2023    Atrial fibrillation (HCC)     Depression 2021    Hyperlipidemia     Hypoxia, sleep related 2021    Neuropathy     Osteoarthritis     Other abnormal findings on diagnostic imaging of central nervous system 2023    Personal history of noncompliance with medical treatment, presenting hazards to health 2021    Severe obesity (BMI 35.0-39.9) with comorbidity (HCC) 02/15/2023    Shortness of breath 2023       Past Surgical History:   Procedure Laterality Date    FOOT NEUROMA SURGERY Right     KNEE ARTHROPLASTY Bilateral     SHOULDER SURGERY Right     REMOVAL OF SPUR    TOTAL HIP ARTHROPLASTY      TUMOR EXCISION Right     WARTHINS TUMOR RIGHT NECK       Family History   Problem Relation Age of Onset    Colon Cancer Sister        Social History     Tobacco Use    Smoking status: Heavy Smoker     Current packs/day: 1.00     Average packs/day: 1 pack/day for 34.2 years (34.2 ttl pk-yrs)     Types: Cigarettes     Start date: 1990    Smokeless tobacco: Never   Substance Use Topics    Alcohol use: Never    Drug use: Never       Medications:     Current Outpatient Medications:     doxycycline hyclate (VIBRA-TABS) 100 MG tablet, Take 1 tablet by mouth 2 times daily for 10 days, Disp: 20 tablet, Rfl: 0    cephALEXin (KEFLEX) 500 MG capsule, Take 1 capsule by mouth 2 times daily for 10 days, Disp: 20 capsule, Rfl: 0    aspirin-acetaminophen-caffeine (EXCEDRIN

## 2024-08-31 SDOH — HEALTH STABILITY: PHYSICAL HEALTH: ON AVERAGE, HOW MANY DAYS PER WEEK DO YOU ENGAGE IN MODERATE TO STRENUOUS EXERCISE (LIKE A BRISK WALK)?: 0 DAYS

## 2024-08-31 ASSESSMENT — PATIENT HEALTH QUESTIONNAIRE - PHQ9
1. LITTLE INTEREST OR PLEASURE IN DOING THINGS: MORE THAN HALF THE DAYS
SUM OF ALL RESPONSES TO PHQ QUESTIONS 1-9: 2
SUM OF ALL RESPONSES TO PHQ QUESTIONS 1-9: 2
SUM OF ALL RESPONSES TO PHQ9 QUESTIONS 1 & 2: 2
2. FEELING DOWN, DEPRESSED OR HOPELESS: NOT AT ALL
SUM OF ALL RESPONSES TO PHQ QUESTIONS 1-9: 2
SUM OF ALL RESPONSES TO PHQ QUESTIONS 1-9: 2

## 2024-08-31 ASSESSMENT — LIFESTYLE VARIABLES
HOW OFTEN DO YOU HAVE A DRINK CONTAINING ALCOHOL: NEVER
HOW MANY STANDARD DRINKS CONTAINING ALCOHOL DO YOU HAVE ON A TYPICAL DAY: 0
HOW OFTEN DO YOU HAVE SIX OR MORE DRINKS ON ONE OCCASION: 1
HOW OFTEN DO YOU HAVE A DRINK CONTAINING ALCOHOL: 1
HOW MANY STANDARD DRINKS CONTAINING ALCOHOL DO YOU HAVE ON A TYPICAL DAY: PATIENT DOES NOT DRINK

## 2024-09-03 ENCOUNTER — OFFICE VISIT (OUTPATIENT)
Dept: PRIMARY CARE CLINIC | Age: 74
End: 2024-09-03

## 2024-09-03 VITALS
DIASTOLIC BLOOD PRESSURE: 68 MMHG | WEIGHT: 263 LBS | HEART RATE: 70 BPM | OXYGEN SATURATION: 95 % | HEIGHT: 73 IN | TEMPERATURE: 97.5 F | BODY MASS INDEX: 34.85 KG/M2 | RESPIRATION RATE: 18 BRPM | SYSTOLIC BLOOD PRESSURE: 124 MMHG

## 2024-09-03 DIAGNOSIS — R25.2 LEG CRAMPING: ICD-10-CM

## 2024-09-03 DIAGNOSIS — Z00.00 MEDICARE ANNUAL WELLNESS VISIT, SUBSEQUENT: Primary | ICD-10-CM

## 2024-09-03 DIAGNOSIS — I87.8 VENOUS STASIS: ICD-10-CM

## 2024-09-03 RX ORDER — POTASSIUM CHLORIDE 1500 MG/1
TABLET, EXTENDED RELEASE ORAL
COMMUNITY
Start: 2024-08-06

## 2024-09-03 ASSESSMENT — PATIENT HEALTH QUESTIONNAIRE - PHQ9
7. TROUBLE CONCENTRATING ON THINGS, SUCH AS READING THE NEWSPAPER OR WATCHING TELEVISION: NOT AT ALL
3. TROUBLE FALLING OR STAYING ASLEEP: NOT AT ALL
10. IF YOU CHECKED OFF ANY PROBLEMS, HOW DIFFICULT HAVE THESE PROBLEMS MADE IT FOR YOU TO DO YOUR WORK, TAKE CARE OF THINGS AT HOME, OR GET ALONG WITH OTHER PEOPLE: NOT DIFFICULT AT ALL
5. POOR APPETITE OR OVEREATING: NOT AT ALL
SUM OF ALL RESPONSES TO PHQ QUESTIONS 1-9: 5
SUM OF ALL RESPONSES TO PHQ QUESTIONS 1-9: 5
8. MOVING OR SPEAKING SO SLOWLY THAT OTHER PEOPLE COULD HAVE NOTICED. OR THE OPPOSITE, BEING SO FIGETY OR RESTLESS THAT YOU HAVE BEEN MOVING AROUND A LOT MORE THAN USUAL: NOT AT ALL
1. LITTLE INTEREST OR PLEASURE IN DOING THINGS: MORE THAN HALF THE DAYS
6. FEELING BAD ABOUT YOURSELF - OR THAT YOU ARE A FAILURE OR HAVE LET YOURSELF OR YOUR FAMILY DOWN: NOT AT ALL
2. FEELING DOWN, DEPRESSED OR HOPELESS: NOT AT ALL
SUM OF ALL RESPONSES TO PHQ QUESTIONS 1-9: 5
SUM OF ALL RESPONSES TO PHQ9 QUESTIONS 1 & 2: 2
9. THOUGHTS THAT YOU WOULD BE BETTER OFF DEAD, OR OF HURTING YOURSELF: NOT AT ALL
SUM OF ALL RESPONSES TO PHQ QUESTIONS 1-9: 5
4. FEELING TIRED OR HAVING LITTLE ENERGY: NEARLY EVERY DAY

## 2024-09-03 NOTE — PROGRESS NOTES
HIM) PACK Take by mouth 2 times daily Yes Peter Huff MD   rivaroxaban (XARELTO) 20 MG TABS tablet Take 1 tablet by mouth daily Yes Peter Huff MD   potassium chloride (K-TAB) 20 MEQ TBCR extended release tablet Take 1 tablet by mouth 2 times daily  Patient not taking: Reported on 9/3/2024  ProviderPeter MD       CareTeam (Including outside providers/suppliers regularly involved in providing care):   Patient Care Team:  Winston Yeager MD as PCP - General (Family Medicine)  Winston Yeager MD as PCP - Empaneled Provider      Reviewed and updated this visit:  Tobacco  Allergies  Meds  Med Hx  Surg Hx  Soc Hx  Fam Hx

## 2024-09-03 NOTE — PATIENT INSTRUCTIONS
body to help you cope with illness, pain, and stress.  How does mindfulness help to relieve stress?  Mindfulness can help quiet your mind and relax your body. Studies show that it can help some people sleep better, feel less anxious, and bring their blood pressure down. And it's been shown to help some people live and cope better with certain health problems like heart disease, depression, chronic pain, and cancer.  How do you practice mindfulness?  To be mindful is to pay attention, to be present, and to be accepting. Like any new skill or habit, being mindful can take practice.  When you're mindful, you do just one thing and you pay close attention to that one thing. For example, you may sit quietly and notice your emotions or how your food tastes and smells.  When you're present, you focus on the things that are happening right now. You let go of your thoughts about the past and the future. When you dwell on the past or the future, you miss moments that can heal and strengthen you. You may miss moments like hearing a child laugh or seeing a friendly face when you think you're all alone.  When you're accepting, you don't  the present moment. Instead you accept your thoughts and feelings as they come.  You can practice anytime, anywhere, and in any way you choose. You can practice in many ways. Here are a few ideas:  While doing your chores, like washing the dishes, let your mind focus on what's in your hand. What does the dish feel like? Is the water warm or cold?  Go outside and take a few deep breaths. What is the air like? Is it warm or cold?  When you can, take some time at the start of your day to sit alone and think.  Take a slow walk by yourself. Count your steps while you breathe in and out.  Try yoga breathing exercises, stretches, and poses to strengthen and relax your muscles.  At work, if you can, try to stop for a few moments each hour. Note how your body feels. Let yourself regroup and let your

## 2024-11-13 ENCOUNTER — TELEPHONE (OUTPATIENT)
Dept: PRIMARY CARE CLINIC | Age: 74
End: 2024-11-13

## 2024-11-21 ENCOUNTER — OFFICE VISIT (OUTPATIENT)
Dept: PRIMARY CARE CLINIC | Age: 74
End: 2024-11-21

## 2024-11-21 VITALS
TEMPERATURE: 97.6 F | WEIGHT: 265 LBS | BODY MASS INDEX: 35.12 KG/M2 | HEIGHT: 73 IN | DIASTOLIC BLOOD PRESSURE: 62 MMHG | OXYGEN SATURATION: 97 % | SYSTOLIC BLOOD PRESSURE: 108 MMHG | RESPIRATION RATE: 18 BRPM | HEART RATE: 89 BPM

## 2024-11-21 DIAGNOSIS — E87.6 HYPOKALEMIA: ICD-10-CM

## 2024-11-21 DIAGNOSIS — D64.9 ANEMIA, UNSPECIFIED TYPE: ICD-10-CM

## 2024-11-21 DIAGNOSIS — I48.91 ATRIAL FIBRILLATION, UNSPECIFIED TYPE (HCC): ICD-10-CM

## 2024-11-21 DIAGNOSIS — D64.9 ANEMIA, UNSPECIFIED TYPE: Primary | ICD-10-CM

## 2024-11-21 DIAGNOSIS — E78.2 MIXED HYPERLIPIDEMIA: ICD-10-CM

## 2024-11-21 LAB
ANION GAP SERPL CALCULATED.3IONS-SCNC: 7 MEQ/L (ref 4–14)
BUN BLDV-MCNC: 20 MG/DL (ref 7–25)
CALCIUM SERPL-MCNC: 8.4 MG/DL (ref 8.5–10.5)
CHLORIDE BLD-SCNC: 102 MEQ/L (ref 98–107)
CO2: 30 MEQ/L (ref 21–31)
CREAT SERPL-MCNC: 1.16 MG/DL (ref 0.7–1.3)
CREATININE + EGFR PANEL: 74 ML/MIN
GFR NON-AFRICAN AMERICAN: 62 ML/MIN
GLUCOSE BLD-MCNC: 98 MG/DL (ref 70–99)
HCT VFR BLD CALC: 29 % (ref 41–50)
HEMOGLOBIN: 9.6 G/DL (ref 13.5–16.5)
MCH RBC QN AUTO: 31.1 PG (ref 28–34)
MCHC RBC AUTO-ENTMCNC: 33.1 G/DL (ref 33–37)
MCV RBC AUTO: 93.8 FL (ref 80–100)
PDW BLD-RTO: 16.2 % (ref 10.9–14.3)
PLATELET # BLD: 398 K/UL (ref 150–450)
PMV BLD AUTO: 8.4 FL (ref 7.4–10.4)
POTASSIUM SERPL-SCNC: 4.2 MEQ/L (ref 3.6–5)
RBC # BLD: 3.09 M/UL (ref 4.5–5.5)
SODIUM BLD-SCNC: 139 MEQ/L (ref 135–145)
WBC # BLD: 8.8 K/UL (ref 4.5–11)

## 2024-11-21 RX ORDER — POTASSIUM CHLORIDE 1500 MG/1
20 TABLET, EXTENDED RELEASE ORAL 2 TIMES DAILY
Qty: 60 TABLET | Refills: 1 | Status: SHIPPED | OUTPATIENT
Start: 2024-11-21

## 2024-11-21 RX ORDER — ROSUVASTATIN CALCIUM 5 MG/1
5 TABLET, COATED ORAL DAILY
COMMUNITY
End: 2024-11-21 | Stop reason: SDUPTHER

## 2024-11-21 RX ORDER — ROSUVASTATIN CALCIUM 5 MG/1
5 TABLET, COATED ORAL DAILY
Qty: 30 TABLET | Refills: 0
Start: 2024-11-21

## 2024-11-21 RX ORDER — METOPROLOL SUCCINATE 50 MG/1
50 TABLET, EXTENDED RELEASE ORAL DAILY
Qty: 30 TABLET | Refills: 0
Start: 2024-11-21

## 2024-11-21 RX ORDER — FERROUS SULFATE 324(65)MG
324 TABLET, DELAYED RELEASE (ENTERIC COATED) ORAL
Qty: 30 TABLET | Refills: 5
Start: 2024-11-21 | End: 2025-05-20

## 2024-11-21 RX ORDER — SACUBITRIL AND VALSARTAN 24; 26 MG/1; MG/1
1 TABLET, FILM COATED ORAL 2 TIMES DAILY
Qty: 60 TABLET | Refills: 0
Start: 2024-11-21

## 2024-11-21 NOTE — PROGRESS NOTES
(with breakfast) 30 tablet 5    ENTRESTO 24-26 MG per tablet Take 1 tablet by mouth 2 times daily 60 tablet 0    metoprolol succinate (TOPROL XL) 50 MG extended release tablet Take 1 tablet by mouth daily 30 tablet 0    aspirin-acetaminophen-caffeine (EXCEDRIN MIGRAINE) 250-250-65 MG per tablet TAKE 2 TABLETS BY MOUTH TWICE A DAY AS NEEDED FOR MIGRAINE. DO NOT TAKE THE PRESCRIBED DOSE MORE THAN 2 DAYS PER WEEK OR 10 DAYS PER MONTH WHILE TAKING THIS MEDICATION, REDUCE CAFFEINE, ACETAMINOPHEN AND ASPIRIN INTAKE FROM ALL SOURCES.      omeprazole (PRILOSEC) 40 MG delayed release capsule       bumetanide (BUMEX) 2 MG tablet Take 1 tablet by mouth 2 times daily      fluticasone (FLONASE) 50 MCG/ACT nasal spray USE 2 SPRAYS IN EACH NOSTRIL EVERY DAY      triamcinolone (KENALOG) 0.1 % cream APPLY A SMALL AMOUNT EXTERNALLY TWICE A DAY TO BACK      cyclobenzaprine (FLEXERIL) 10 MG tablet 1 tablet      Handicap Placard MISC by Does not apply route 1 each 0    Cholecalciferol 50 MCG (2000 UT) TABS Take 1 tablet by mouth daily      Omega-3 Fatty Acids (FISH OIL) 1000 MG CAPS Take 2 capsules by mouth daily      Multiple Vitamins-Minerals (MULTI FOR HIM) PACK Take by mouth 2 times daily      rivaroxaban (XARELTO) 20 MG TABS tablet Take 1 tablet by mouth daily (Patient not taking: Reported on 11/21/2024)       No current facility-administered medications for this visit.            ROS   Reviewed as above, otherwise negative       Physical Exam   Vitals:   Vitals:    11/21/24 1050   BP: 108/62   Pulse: 89   Resp: 18   Temp: 97.6 °F (36.4 °C)   SpO2: 97%       Physical Exam  Vitals reviewed.   Constitutional:       Appearance: Normal appearance.   HENT:      Head: Normocephalic and atraumatic.   Cardiovascular:      Rate and Rhythm: Normal rate and regular rhythm.      Pulses: Normal pulses.      Heart sounds: Normal heart sounds.   Pulmonary:      Effort: Pulmonary effort is normal.      Breath sounds: Normal breath sounds.

## 2025-01-16 RX ORDER — POTASSIUM CHLORIDE 1500 MG/1
20 TABLET, EXTENDED RELEASE ORAL 2 TIMES DAILY
Qty: 60 TABLET | Refills: 1 | Status: SHIPPED | OUTPATIENT
Start: 2025-01-16

## 2025-01-16 NOTE — TELEPHONE ENCOUNTER
Name of Medication(s) Requested:  Requested Prescriptions     Pending Prescriptions Disp Refills    potassium chloride (K-TAB) 20 MEQ TBCR extended release tablet [Pharmacy Med Name: POTASSIUM CHLORIDE 20MEQ ER TABLETS] 60 tablet 1     Sig: TAKE 1 TABLET BY MOUTH TWICE DAILY       Medication is on current medication list Yes    Dosage and directions were verified? Yes    Quantity verified: 30 day supply     Pharmacy Verified?  Yes    Last Appointment:  11/21/2024    Future appts:  Future Appointments   Date Time Provider Department Center   2/20/2025 10:30 AM Winston Yeager MD [place] [facility] DEP        (If no appt send self scheduling link. .REFILLAPPT)  Scheduling request sent?     [] Yes  [x] No    Does patient need updated?  [] Yes  [x] No

## 2025-02-20 ENCOUNTER — OFFICE VISIT (OUTPATIENT)
Dept: PRIMARY CARE CLINIC | Age: 75
End: 2025-02-20

## 2025-02-20 VITALS
TEMPERATURE: 97.1 F | DIASTOLIC BLOOD PRESSURE: 62 MMHG | BODY MASS INDEX: 34.85 KG/M2 | HEART RATE: 98 BPM | RESPIRATION RATE: 18 BRPM | WEIGHT: 263 LBS | OXYGEN SATURATION: 98 % | HEIGHT: 73 IN | SYSTOLIC BLOOD PRESSURE: 92 MMHG

## 2025-02-20 DIAGNOSIS — J44.9 CHRONIC OBSTRUCTIVE PULMONARY DISEASE, UNSPECIFIED COPD TYPE (HCC): ICD-10-CM

## 2025-02-20 DIAGNOSIS — F32.1 CURRENT MODERATE EPISODE OF MAJOR DEPRESSIVE DISORDER WITHOUT PRIOR EPISODE (HCC): ICD-10-CM

## 2025-02-20 DIAGNOSIS — E78.2 MIXED HYPERLIPIDEMIA: ICD-10-CM

## 2025-02-20 DIAGNOSIS — I48.91 ATRIAL FIBRILLATION, UNSPECIFIED TYPE (HCC): ICD-10-CM

## 2025-02-20 PROBLEM — S46.219A BICEPS TENDON TEAR: Status: RESOLVED | Noted: 2023-08-31 | Resolved: 2025-02-20

## 2025-02-20 PROBLEM — L98.9 SKIN LESION: Status: RESOLVED | Noted: 2022-08-24 | Resolved: 2025-02-20

## 2025-02-20 PROBLEM — R06.02 SHORTNESS OF BREATH: Status: RESOLVED | Noted: 2023-08-16 | Resolved: 2025-02-20

## 2025-02-20 RX ORDER — KETOCONAZOLE 20 MG/ML
SHAMPOO, SUSPENSION TOPICAL
COMMUNITY
Start: 2025-01-28

## 2025-02-20 RX ORDER — AMITRIPTYLINE HYDROCHLORIDE 10 MG/1
10 TABLET ORAL NIGHTLY
COMMUNITY
Start: 2025-02-13

## 2025-02-20 RX ORDER — ROSUVASTATIN CALCIUM 5 MG/1
5 TABLET, COATED ORAL DAILY
Qty: 30 TABLET | Refills: 0 | Status: SHIPPED | OUTPATIENT
Start: 2025-02-20

## 2025-02-20 RX ORDER — CLOTRIMAZOLE 1 G/ML
SOLUTION TOPICAL
COMMUNITY
Start: 2025-01-28

## 2025-02-20 RX ORDER — BUMETANIDE 2 MG/1
2 TABLET ORAL 2 TIMES DAILY
Qty: 30 TABLET | Refills: 0
Start: 2025-02-20

## 2025-02-20 SDOH — ECONOMIC STABILITY: FOOD INSECURITY: WITHIN THE PAST 12 MONTHS, THE FOOD YOU BOUGHT JUST DIDN'T LAST AND YOU DIDN'T HAVE MONEY TO GET MORE.: NEVER TRUE

## 2025-02-20 SDOH — ECONOMIC STABILITY: FOOD INSECURITY: WITHIN THE PAST 12 MONTHS, YOU WORRIED THAT YOUR FOOD WOULD RUN OUT BEFORE YOU GOT MONEY TO BUY MORE.: NEVER TRUE

## 2025-02-20 ASSESSMENT — PATIENT HEALTH QUESTIONNAIRE - PHQ9
4. FEELING TIRED OR HAVING LITTLE ENERGY: NEARLY EVERY DAY
9. THOUGHTS THAT YOU WOULD BE BETTER OFF DEAD, OR OF HURTING YOURSELF: NOT AT ALL
10. IF YOU CHECKED OFF ANY PROBLEMS, HOW DIFFICULT HAVE THESE PROBLEMS MADE IT FOR YOU TO DO YOUR WORK, TAKE CARE OF THINGS AT HOME, OR GET ALONG WITH OTHER PEOPLE: NOT DIFFICULT AT ALL
6. FEELING BAD ABOUT YOURSELF - OR THAT YOU ARE A FAILURE OR HAVE LET YOURSELF OR YOUR FAMILY DOWN: NOT AT ALL
7. TROUBLE CONCENTRATING ON THINGS, SUCH AS READING THE NEWSPAPER OR WATCHING TELEVISION: NOT AT ALL
SUM OF ALL RESPONSES TO PHQ QUESTIONS 1-9: 5
SUM OF ALL RESPONSES TO PHQ QUESTIONS 1-9: 5
3. TROUBLE FALLING OR STAYING ASLEEP: NOT AT ALL
1. LITTLE INTEREST OR PLEASURE IN DOING THINGS: SEVERAL DAYS
SUM OF ALL RESPONSES TO PHQ QUESTIONS 1-9: 5
2. FEELING DOWN, DEPRESSED OR HOPELESS: SEVERAL DAYS
5. POOR APPETITE OR OVEREATING: NOT AT ALL
SUM OF ALL RESPONSES TO PHQ9 QUESTIONS 1 & 2: 2
SUM OF ALL RESPONSES TO PHQ QUESTIONS 1-9: 5
8. MOVING OR SPEAKING SO SLOWLY THAT OTHER PEOPLE COULD HAVE NOTICED. OR THE OPPOSITE, BEING SO FIGETY OR RESTLESS THAT YOU HAVE BEEN MOVING AROUND A LOT MORE THAN USUAL: NOT AT ALL

## 2025-03-06 NOTE — PROGRESS NOTES
Status: He is alert.   Psychiatric:         Mood and Affect: Mood normal.         Behavior: Behavior normal.           Assessment and Plan       1. Chronic obstructive pulmonary disease, unspecified COPD type ([facility])  Comments:  Stable.  Denies any worsening shortness of breath.  Currently not using any inhalers.  2. Atrial fibrillation, unspecified type ([facility])  Comments:  Stable.  Metoprolol 50 mg daily, Eliquis 5 mg twice daily and Entresto 24-26 mg daily.  Orders:  -     apixaban (ELIQUIS) 5 MG TABS tablet; Take 1 tablet by mouth 2 times daily, Disp-60 tablet, R-1Adjust Sig  -     bumetanide (BUMEX) 2 MG tablet; Take 1 tablet by mouth 2 times daily, Disp-30 tablet, R-0Adjust Sig  3. Current moderate episode of major depressive disorder without prior episode ([facility])  Comments:  Stable.  Denies any SI or HI.  Denies want to start any medication for this at this time  4. Mixed hyperlipidemia  Comments:  Stable.  Continue Crestor 5 mg daily  Orders:  -     rosuvastatin (CRESTOR) 5 MG tablet; Take 1 tablet by mouth daily, Disp-30 tablet, R-0Normal         Assessment & Plan  1. Congestive Heart Failure.  He continues to take Bumex for swelling and Eliquis for his condition. No changes in medication were made.    2. Depression.  His mood remains about the same, and he does not wish to start any new medications for it at this time.    3. Hypoglycemia.  His last hemoglobin A1c was 5.6 in December 2023, which is within the normal range but at the border of prediabetes. No new blood work is needed at this time.    4. Chronic Obstructive Pulmonary Disease.  He does not use any inhalers and does not want a rescue inhaler. He continues to smoke and has no plans to quit.    5. Elevated PSA.  His PSA levels have been high but stable, and he is monitored [facility]rolog[facility] and [facility]. No new PSA test is needed at this time.    6. Biceps tear.  He reports no residual weakness from this injury.    Follow-up  The patient will follow up in 
room air

## 2025-03-17 RX ORDER — POTASSIUM CHLORIDE 1500 MG/1
20 TABLET, EXTENDED RELEASE ORAL 2 TIMES DAILY
Qty: 60 TABLET | Refills: 1 | Status: SHIPPED | OUTPATIENT
Start: 2025-03-17

## 2025-03-17 NOTE — TELEPHONE ENCOUNTER
Name of Medication(s) Requested:  Requested Prescriptions     Pending Prescriptions Disp Refills    potassium chloride (K-TAB) 20 MEQ TBCR extended release tablet [Pharmacy Med Name: POTASSIUM CHLORIDE 20MEQ ER TABLETS] 60 tablet 1     Sig: TAKE 1 TABLET BY MOUTH TWICE DAILY       Medication is on current medication list Yes    Dosage and directions were verified? Yes    Quantity verified: 30 day supply     Pharmacy Verified?  Yes    Last Appointment:  2/20/2025    Future appts:  Future Appointments   Date Time Provider Department Center   8/14/2025 10:30 AM Winston Yeager MD [place] [facility] DE[facility]        (If no appt send self scheduling link. .REFILLAPPT)  Scheduling request sent?     [] Yes  [x] No    Does patient need updated?  [] Yes  [x] No

## 2025-05-12 RX ORDER — POTASSIUM CHLORIDE 1500 MG/1
20 TABLET, EXTENDED RELEASE ORAL 2 TIMES DAILY
Qty: 60 TABLET | Refills: 1 | Status: SHIPPED | OUTPATIENT
Start: 2025-05-12

## 2025-05-12 NOTE — TELEPHONE ENCOUNTER
Name of Medication(s) Requested:  Requested Prescriptions     Pending Prescriptions Disp Refills    potassium chloride (K-TAB) 20 MEQ TBCR extended release tablet [Pharmacy Med Name: POTASSIUM CHLORIDE 20MEQ ER TABLETS] 60 tablet 1     Sig: TAKE 1 TABLET BY MOUTH TWICE DAILY       Medication is on current medication list Yes    Dosage and directions were verified? Yes    Quantity verified: 30 day supply     Pharmacy Verified?  Yes    Last Appointment:  2/20/2025    Future appts:  Future Appointments   Date Time Provider Department Center   8/14/2025 10:30 AM Winston Yeager MD [place] [facility] DEP        (If no appt send self scheduling link. .REFILLAPPT)  Scheduling request sent?     [] Yes  [x] No    Does patient need updated?  [] Yes  [x] No

## 2025-08-12 SDOH — HEALTH STABILITY: PHYSICAL HEALTH: ON AVERAGE, HOW MANY DAYS PER WEEK DO YOU ENGAGE IN MODERATE TO STRENUOUS EXERCISE (LIKE A BRISK WALK)?: 0 DAYS

## 2025-08-12 ASSESSMENT — LIFESTYLE VARIABLES
HOW OFTEN DO YOU HAVE A DRINK CONTAINING ALCOHOL: NEVER
HOW MANY STANDARD DRINKS CONTAINING ALCOHOL DO YOU HAVE ON A TYPICAL DAY: 0
HOW MANY STANDARD DRINKS CONTAINING ALCOHOL DO YOU HAVE ON A TYPICAL DAY: PATIENT DOES NOT DRINK
HOW OFTEN DO YOU HAVE SIX OR MORE DRINKS ON ONE OCCASION: 1
HOW OFTEN DO YOU HAVE A DRINK CONTAINING ALCOHOL: 1

## 2025-08-12 ASSESSMENT — PATIENT HEALTH QUESTIONNAIRE - PHQ9
SUM OF ALL RESPONSES TO PHQ QUESTIONS 1-9: 1
1. LITTLE INTEREST OR PLEASURE IN DOING THINGS: SEVERAL DAYS
SUM OF ALL RESPONSES TO PHQ QUESTIONS 1-9: 1
2. FEELING DOWN, DEPRESSED OR HOPELESS: NOT AT ALL

## 2025-08-14 ENCOUNTER — OFFICE VISIT (OUTPATIENT)
Dept: PRIMARY CARE CLINIC | Age: 75
End: 2025-08-14

## 2025-08-14 VITALS
TEMPERATURE: 97.4 F | WEIGHT: 267 LBS | HEIGHT: 73 IN | OXYGEN SATURATION: 91 % | HEART RATE: 77 BPM | DIASTOLIC BLOOD PRESSURE: 60 MMHG | BODY MASS INDEX: 35.39 KG/M2 | RESPIRATION RATE: 20 BRPM | SYSTOLIC BLOOD PRESSURE: 92 MMHG

## 2025-08-14 DIAGNOSIS — E78.2 MIXED HYPERLIPIDEMIA: ICD-10-CM

## 2025-08-14 DIAGNOSIS — Z12.5 SCREENING PSA (PROSTATE SPECIFIC ANTIGEN): ICD-10-CM

## 2025-08-14 DIAGNOSIS — K21.9 GASTROESOPHAGEAL REFLUX DISEASE WITHOUT ESOPHAGITIS: ICD-10-CM

## 2025-08-14 DIAGNOSIS — I48.91 ATRIAL FIBRILLATION, UNSPECIFIED TYPE (HCC): ICD-10-CM

## 2025-08-14 DIAGNOSIS — E79.0 ELEVATED URIC ACID IN BLOOD: ICD-10-CM

## 2025-08-14 DIAGNOSIS — Z00.00 MEDICARE ANNUAL WELLNESS VISIT, SUBSEQUENT: Primary | ICD-10-CM

## 2025-08-14 LAB
ALBUMIN: 4.1 G/DL (ref 3.5–5.2)
ALP BLD-CCNC: 79 U/L (ref 40–129)
ALT SERPL-CCNC: 34 U/L (ref 0–50)
ANION GAP SERPL CALCULATED.3IONS-SCNC: 14 MMOL/L (ref 7–16)
AST SERPL-CCNC: 35 U/L (ref 0–50)
BILIRUB SERPL-MCNC: 0.3 MG/DL (ref 0–1.2)
BUN BLDV-MCNC: 25 MG/DL (ref 8–23)
CALCIUM SERPL-MCNC: 9.6 MG/DL (ref 8.8–10.2)
CHLORIDE BLD-SCNC: 103 MMOL/L (ref 98–107)
CHOLESTEROL, TOTAL: 155 MG/DL
CO2: 24 MMOL/L (ref 22–29)
CREAT SERPL-MCNC: 1.2 MG/DL (ref 0.7–1.2)
GFR, ESTIMATED: 66 ML/MIN/1.73M2
GLUCOSE BLD-MCNC: 111 MG/DL (ref 74–99)
HCT VFR BLD CALC: 45.6 % (ref 37–54)
HDLC SERPL-MCNC: 27 MG/DL
HEMOGLOBIN: 14.8 G/DL (ref 12.5–16.5)
LDL CHOLESTEROL: ABNORMAL MG/DL
MCH RBC QN AUTO: 31.8 PG (ref 26–35)
MCHC RBC AUTO-ENTMCNC: 32.5 G/DL (ref 32–34.5)
MCV RBC AUTO: 98.1 FL (ref 80–99.9)
PDW BLD-RTO: 14.2 % (ref 11.5–15)
PLATELET # BLD: 213 K/UL (ref 130–450)
PMV BLD AUTO: 10.7 FL (ref 7–12)
POTASSIUM SERPL-SCNC: 4.3 MMOL/L (ref 3.5–5.1)
PROSTATE SPECIFIC ANTIGEN: 5.15 NG/ML (ref 0–4)
RBC # BLD: 4.65 M/UL (ref 3.8–5.8)
SODIUM BLD-SCNC: 142 MMOL/L (ref 136–145)
TOTAL PROTEIN: 7.3 G/DL (ref 6.4–8.3)
TRIGL SERPL-MCNC: 410 MG/DL
URIC ACID: 6.9 MG/DL (ref 3.4–7)
VLDLC SERPL CALC-MCNC: ABNORMAL MG/DL
WBC # BLD: 8.9 K/UL (ref 4.5–11.5)

## 2025-08-14 RX ORDER — ALLOPURINOL 100 MG/1
100 TABLET ORAL
COMMUNITY
Start: 2025-04-01 | End: 2025-08-14 | Stop reason: SDUPTHER

## 2025-08-14 RX ORDER — ALLOPURINOL 100 MG/1
100 TABLET ORAL DAILY
Qty: 30 TABLET | Refills: 0
Start: 2025-08-14

## 2025-08-14 RX ORDER — OMEPRAZOLE 40 MG/1
40 CAPSULE, DELAYED RELEASE ORAL DAILY
Qty: 30 CAPSULE | Refills: 0
Start: 2025-08-14